# Patient Record
Sex: FEMALE | Race: WHITE | Employment: UNEMPLOYED | URBAN - NONMETROPOLITAN AREA
[De-identification: names, ages, dates, MRNs, and addresses within clinical notes are randomized per-mention and may not be internally consistent; named-entity substitution may affect disease eponyms.]

---

## 2021-08-29 ENCOUNTER — APPOINTMENT (OUTPATIENT)
Dept: GENERAL RADIOLOGY | Age: 51
End: 2021-08-29
Payer: MEDICARE

## 2021-08-29 ENCOUNTER — HOSPITAL ENCOUNTER (EMERGENCY)
Age: 51
Discharge: HOME | End: 2021-08-29
Admitting: NURSE PRACTITIONER
Payer: MEDICARE

## 2021-08-29 VITALS
OXYGEN SATURATION: 100 % | SYSTOLIC BLOOD PRESSURE: 117 MMHG | HEART RATE: 80 BPM | TEMPERATURE: 98.1 F | WEIGHT: 200 LBS | RESPIRATION RATE: 21 BRPM | HEIGHT: 61 IN | DIASTOLIC BLOOD PRESSURE: 71 MMHG | BODY MASS INDEX: 37.76 KG/M2

## 2021-08-29 DIAGNOSIS — M54.6 ACUTE BILATERAL THORACIC BACK PAIN: ICD-10-CM
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
## 2021-08-29 DIAGNOSIS — M25.562 ACUTE PAIN OF LEFT KNEE: ICD-10-CM

## 2021-08-29 PROCEDURE — 99284 EMERGENCY DEPT VISIT MOD MDM: CPT

## 2021-08-29 PROCEDURE — 72070 X-RAY EXAM THORAC SPINE 2VWS: CPT

## 2021-08-29 PROCEDURE — 6370000000 HC RX 637 (ALT 250 FOR IP): Performed by: NURSE PRACTITIONER

## 2021-08-29 PROCEDURE — 73562 X-RAY EXAM OF KNEE 3: CPT

## 2021-08-29 RX ORDER — LIDOCAINE 50 MG/G
1 PATCH TOPICAL EVERY 24 HOURS
Qty: 10 PATCH | Refills: 0 | Status: SHIPPED | OUTPATIENT
Start: 2021-08-29 | End: 2021-09-08

## 2021-08-29 RX ORDER — HYDROCODONE BITARTRATE AND ACETAMINOPHEN 5; 325 MG/1; MG/1
1 TABLET ORAL ONCE
Status: COMPLETED | OUTPATIENT
Start: 2021-08-29 | End: 2021-08-29

## 2021-08-29 RX ADMIN — HYDROCODONE BITARTRATE AND ACETAMINOPHEN 1 TABLET: 5; 325 TABLET ORAL at 12:20

## 2021-08-29 ASSESSMENT — ENCOUNTER SYMPTOMS
SHORTNESS OF BREATH: 0
CHEST TIGHTNESS: 0
BACK PAIN: 1
NAUSEA: 0
DIARRHEA: 0
ABDOMINAL PAIN: 0

## 2021-08-29 ASSESSMENT — PAIN SCALES - GENERAL: PAINLEVEL_OUTOF10: 6

## 2021-08-29 NOTE — ED PROVIDER NOTES
[facility] EMERGENCY DEPT  EMERGENCY DEPARTMENT ENCOUNTER      Pt Name: Jenifer Pack  MRN: [id number]  [place] 1970  Date of evaluation: 8/29/2021  Provider: DAVIDSON Torre 6864       Chief Complaint   Patient presents with    Fall     mechanical fall while walking. co L foot and knee pain. denies LOC or hitting head         HISTORY OF PRESENT ILLNESS   (Location/Symptom, Timing/Onset, Context/Setting, Quality, Duration, Modifying Factors, Severity)  Note limiting factors. Jenifer Pack is a 54 y.o. female who presents to the emergency department with concern for midback pain and left knee pain after falling on Friday. States she was wearing new shoes and got tripped up and fell. Did not hit head no LOC, no head or neck pain. No chest or abdominal pain. Has been ambulatory since. Has tried tylenol at home without relief. Nothing makes better or worse. HPI    Nursing Notes were reviewed. REVIEW OF SYSTEMS    (2-9 systems for level 4, 10 or more for level 5)     Review of Systems   Constitutional: Negative for chills and fever. HENT: Negative for congestion. Respiratory: Negative for chest tightness and shortness of breath. Cardiovascular: Negative for chest pain, palpitations and leg swelling. Gastrointestinal: Negative for abdominal pain, diarrhea and nausea. Genitourinary: Negative for dysuria, flank pain, frequency and urgency. Musculoskeletal: Positive for back pain. Negative for neck pain. Neurological: Negative for dizziness, syncope, weakness, light-headedness, numbness and headaches. Except as noted above the remainder of the review of systems was reviewed and negative. PAST MEDICAL HISTORY   No past medical history on file. SURGICAL HISTORY     No past surgical history on file.       CURRENT MEDICATIONS       Previous Medications    No medications on file       ALLERGIES     Augmentin [amoxicillin-pot clavulanate], Cephalosporins, Daypro [oxaprozin], Linzess [linaclotide], Magnesium-containing compounds, Nabumetone, Naproxen, Penicillins, Savella [milnacipran], Sennosides, Septra [sulfamethoxazole-trimethoprim], Tagamet [cimetidine], and Zyrtec [cetirizine]    FAMILY HISTORY     No family history on file. SOCIAL HISTORY       Social History     Socioeconomic History    Marital status: Single     Spouse name: Not on file    Number of children: Not on file    Years of education: Not on file    Highest education level: Not on file   Occupational History    Not on file   Tobacco Use    Smoking status: Not on file   Substance and Sexual Activity    Alcohol use: Not on file    Drug use: Not on file    Sexual activity: Not on file   Other Topics Concern    Not on file   Social History Narrative    Not on file     Social Determinants of Health     Financial Resource Strain:     Difficulty of Paying Living Expenses:    Food Insecurity:     Worried About Running Out of Food in the Last Year:     [address] in the Last Year:    Transportation Needs:     Lack of Transportation (Medical):      Lack of Transportation (Non-Medical):    Physical Activity:     Days of Exercise per Week:     Minutes of Exercise per Session:    Stress:     Feeling of Stress :    Social Connections:     Frequency of Communication with Friends and Family:     Frequency of Social Gatherings with Friends and Family:     Attends Che[employer] Services:     Active Member of Clubs or Organizations:     Attends Club or Organization Meetings:     Marital Status:    Intimate Partner Violence:     Fear of Current or Ex-Partner:     Emotionally Abused:     Physically Abused:     Sexually Abused:        SCREENINGS        [place] Coma Scale  Eye Opening: Spontaneous  Best Verbal Response: Oriented  Best Motor Response: Obeys commands  [place] Coma Scale Score: 15               PHYSICAL EXAM    (up to 7 for level 4, 8 or more for level 5)     ED Triage Vitals [08/29/21 1141]   BP Temp Temp Source Pulse Resp SpO2 Height Weight   114/76 98.2 °F (36.8 °C) Oral 88 16 94 % 5' 1\" (1.549 m) 200 lb (90.7 kg)       Physical Exam  Vitals reviewed. Constitutional:       General: She is not in acute distress. Appearance: Normal appearance. She is obese. She is not ill-appearing, toxic-appearing or diaphoretic. HENT:      Head: Normocephalic and atraumatic. Right Ear: Tympanic membrane normal.      Left Ear: Tympanic membrane normal.      Nose: Nose normal.      Mouth/Throat:      Mouth: Mucous membranes are moist.      Pharynx: Oropharynx is clear. Eyes:      Extraocular Movements: Extraocular movements intact. Conjunctiva/sclera: Conjunctivae normal.      Pupils: Pupils are equal, round, and reactive to light. Cardiovascular:      Rate and Rhythm: Normal rate and regular rhythm. Pulses: Normal pulses. Heart sounds: Normal heart sounds. Pulmonary:      Effort: Pulmonary effort is normal.      Breath sounds: Normal breath sounds. Abdominal:      General: Bowel sounds are normal.      Palpations: Abdomen is soft. Tenderness: There is no abdominal tenderness. Musculoskeletal:         General: No swelling, deformity or signs of injury. Cervical back: Normal range of motion. Thoracic back: No swelling, edema, deformity, signs of trauma, lacerations, spasms or bony tenderness. Normal range of motion. No scoliosis. Back:       Left knee: No swelling, deformity, effusion, erythema, ecchymosis, lacerations or bony tenderness. Normal range of motion. Tenderness (generalized) present. No medial joint line tenderness. Right lower leg: No edema. Left lower leg: No edema. Skin:     General: Skin is warm and dry. Neurological:      Mental Status: She is alert.          DIAGNOSTIC RESULTS     EKG: All EKG's are interpreted by the Emergency Department Physician who either signs or Co-signs this chart in the absence of a Monitoring:     No flowsheet data found.     (Please note that portions of this note were completed with a voice recognition program.  Efforts were made to edit the dictations but occasionally words are mis-transcribed.)    DAVIDSON Garcia CNP (electronically signed)  Attending Emergency Physician         DAVIDSON Garcia CNP  08/29/21 [id number]

## 2021-11-08 ENCOUNTER — OFFICE VISIT (OUTPATIENT)
Dept: PRIMARY CARE CLINIC | Age: 51
End: 2021-11-08
Payer: MEDICARE

## 2021-11-08 VITALS
HEART RATE: 94 BPM | TEMPERATURE: 97.4 F | BODY MASS INDEX: 49.5 KG/M2 | WEIGHT: 262 LBS | SYSTOLIC BLOOD PRESSURE: 118 MMHG | OXYGEN SATURATION: 97 % | DIASTOLIC BLOOD PRESSURE: 74 MMHG

## 2021-11-08 DIAGNOSIS — N62 LARGE BREASTS: ICD-10-CM

## 2021-11-08 DIAGNOSIS — Z00.00 ROUTINE PHYSICAL EXAMINATION: Primary | ICD-10-CM

## 2021-11-08 DIAGNOSIS — F31.9 BIPOLAR 1 DISORDER (HCC): ICD-10-CM

## 2021-11-08 DIAGNOSIS — Z23 NEED FOR INFLUENZA VACCINATION: ICD-10-CM

## 2021-11-08 DIAGNOSIS — M79.7 FIBROMYALGIA: ICD-10-CM

## 2021-11-08 DIAGNOSIS — M54.9 MID BACK PAIN: ICD-10-CM

## 2021-11-08 DIAGNOSIS — E55.9 VITAMIN D DEFICIENCY: ICD-10-CM

## 2021-11-08 PROCEDURE — 90674 CCIIV4 VAC NO PRSV 0.5 ML IM: CPT | Performed by: NURSE PRACTITIONER

## 2021-11-08 PROCEDURE — G0008 ADMIN INFLUENZA VIRUS VAC: HCPCS | Performed by: NURSE PRACTITIONER

## 2021-11-08 PROCEDURE — 99214 OFFICE O/P EST MOD 30 MIN: CPT | Performed by: NURSE PRACTITIONER

## 2021-11-08 RX ORDER — BUMETANIDE 2 MG/1
TABLET ORAL DAILY
COMMUNITY
Start: 2021-09-25 | End: 2022-01-03 | Stop reason: SDUPTHER

## 2021-11-08 RX ORDER — GABAPENTIN 600 MG/1
600 TABLET ORAL 3 TIMES DAILY
Qty: 90 TABLET | Refills: 3 | Status: SHIPPED | OUTPATIENT
Start: 2021-11-08 | End: 2022-05-03

## 2021-11-08 RX ORDER — CITALOPRAM 40 MG/1
TABLET ORAL DAILY
COMMUNITY
Start: 2021-11-07

## 2021-11-08 RX ORDER — CHOLECALCIFEROL (VITAMIN D3) 125 MCG
500 CAPSULE ORAL DAILY
COMMUNITY

## 2021-11-08 RX ORDER — LEVOTHYROXINE SODIUM 0.1 MG/1
TABLET ORAL DAILY
COMMUNITY
End: 2022-01-03 | Stop reason: SDUPTHER

## 2021-11-08 RX ORDER — ZINC GLUCONATE 50 MG
50 TABLET ORAL DAILY
COMMUNITY

## 2021-11-08 RX ORDER — MULTIVIT WITH MINERALS/LUTEIN
1000 TABLET ORAL DAILY
COMMUNITY

## 2021-11-08 RX ORDER — BUPROPION HYDROCHLORIDE 150 MG/1
150 TABLET, EXTENDED RELEASE ORAL 2 TIMES DAILY
COMMUNITY
End: 2021-11-08

## 2021-11-08 RX ORDER — ARIPIPRAZOLE 5 MG/1
5 TABLET ORAL DAILY
Qty: 30 TABLET | Refills: 11 | Status: SHIPPED | OUTPATIENT
Start: 2021-11-08 | End: 2022-01-03 | Stop reason: SDUPTHER

## 2021-11-08 RX ORDER — GABAPENTIN 300 MG/1
2 CAPSULE ORAL 3 TIMES DAILY
COMMUNITY
Start: 2021-10-04 | End: 2021-11-08 | Stop reason: ALTCHOICE

## 2021-11-08 RX ORDER — OMEPRAZOLE 20 MG/1
TABLET, DELAYED RELEASE ORAL DAILY
COMMUNITY
End: 2022-01-03 | Stop reason: SDUPTHER

## 2021-11-08 RX ORDER — PHENTERMINE HYDROCHLORIDE 37.5 MG/1
TABLET ORAL DAILY
COMMUNITY
End: 2021-11-08

## 2021-11-08 RX ORDER — POTASSIUM CHLORIDE 750 MG/1
TABLET, FILM COATED, EXTENDED RELEASE ORAL DAILY
COMMUNITY
Start: 2021-09-25 | End: 2022-01-03 | Stop reason: SDUPTHER

## 2021-11-08 RX ORDER — ERGOCALCIFEROL 1.25 MG/1
CAPSULE ORAL WEEKLY
COMMUNITY

## 2021-11-08 RX ORDER — CYANOCOBALAMIN 1000 UG/ML
1000 INJECTION INTRAMUSCULAR; SUBCUTANEOUS ONCE
COMMUNITY

## 2021-11-08 RX ORDER — NYSTATIN 100000 U/G
CREAM TOPICAL
COMMUNITY

## 2021-11-08 ASSESSMENT — ENCOUNTER SYMPTOMS
DIARRHEA: 0
NAUSEA: 0
SORE THROAT: 0
TROUBLE SWALLOWING: 0
SHORTNESS OF BREATH: 0
CONSTIPATION: 0
RHINORRHEA: 0
SINUS PRESSURE: 0
VOMITING: 0
COUGH: 0
ABDOMINAL PAIN: 0

## 2021-11-08 NOTE — PROGRESS NOTES
Vaccine Information Sheet, \"Influenza - Inactivated\"  given to Shellye Ahumada, or parent/legal guardian of  Shellye Ahumada and verbalized understanding. Patient responses:    Have you ever had a reaction to a flu vaccine? No  Are you able to eat eggs without adverse effects? Yes  Do you have any current illness? No  Have you ever had Guillian Brea Syndrome? No    Flu vaccine given per order. Please see immunization tab.

## 2021-11-08 NOTE — PROGRESS NOTES
Michael Arroyo (:  1970) is a 54 y.o. female,New patient, here for evaluation of the following chief complaint(s):  New Patient (former [facility] [address] [place] [address]. discuss medication, referral for poss breast reduction. also referral for ortho [facility] pain [facility]. )      ASSESSMENT/PLAN:    ICD-10-CM    1. Routine physical examination  Z00.00 CBC Auto Differential     Comprehensive Metabolic Panel     Vitamin B12     TSH without Reflex     Hemoglobin A1C     Vitamin D 25 Hydroxy     INFLUENZA, MDCK QUADV, 2 YRS AND OLDER, IM, PF, PREFILL SYR OR SDV, 0.5ML (FLUCELVAX QUADV, PF)   2. Need for influenza vaccination  Z23 INFLUENZA, MDCK QUADV, 2 YRS AND OLDER, IM, PF, PREFILL SYR OR SDV, 0.5ML (FLUCELVAX QUADV, PF)   3. Vitamin D deficiency  E55.9 Comprehensive Metabolic Panel     Vitamin D 25 Hydroxy   4. Fibromyalgia  M79.7 gabapentin (NEURONTIN) 600 MG tablet   5. Bipolar 1 disorder (HCC)  F31.9 ARIPiprazole (ABILIFY) 5 MG tablet   6. Large breasts  N62    7. Mid back pain  M54.9        Return in about 6 weeks (around 2021). SUBJECTIVE/OBJECTIVE:  HPI  Here to establish care  Former pt of Dr Victoria Began in [facility] 53    Eyes: This year  Dentist:  routinely  Skin:  No concerns  SBE:  Doesn't do  Mammo:  Last year? Pap:  Due in   Menses: not had one since july  Colonoscopy:  hx of polyps not sure when due  Dexa Scan:  none  Calcium & Vit. D:  daily  MVI:  none  Supplements:  Vitamin C, zinc and b12  Asa:  none  Labs:  due  Exercise:  Daily activity  Body Image: overweight  Diet and Nutr: no special diet   Depression: on medicine concerns with bipolar per sister  Fall:  none  EOL:  none  Tobacco:  none  Substance:  No alcohol, no illicits  Immunizations:  Needs yearly flu and covid series  Sleep good  Cognition good    Health Maintenance: needs labs, covid and flu vaccines.      ? Bipolar   Drop of pen she is crying, screaming, cussing  She is impulsive per family report  Longest gone without sleep is 10-12 hours. On celexa, wellbutrin      GERD  On omeprazole  No dysphagia    Hypothyroid  On synthroid  No concerns    Fibromyalgia and neuropathy  On gabapentin. Symptoms somewhat controlled on  Medicine. B12 def  On b12 injections monthly and daily otc   Have not had one since July  Have been on them for years. Vitamin d def  On vitamin d once a week. Chronic mid back pain  ? [facility] Nothing makes it worse. On gabapentin. Would like breast reduction. Have mid back pain. Wears a 44M bra size. Mammogram done last year, Due next year. Pap smear thinks due in 2023  Colonoscopy (have had 2-3 of them) hx of polyps. /74   Pulse 94   Temp 97.4 °F (36.3 °C) (Temporal)   Wt 262 lb (118.8 kg)   SpO2 97%   BMI 49.50 kg/m²     Review of Systems   Constitutional: Negative for activity change, appetite change, fatigue, fever and unexpected weight change. HENT: Negative for congestion, hearing loss, rhinorrhea, sinus pressure, sore throat and trouble swallowing. Eyes: Negative for visual disturbance. Respiratory: Negative for cough and shortness of breath. Cardiovascular: Negative for chest pain, palpitations and leg swelling. Gastrointestinal: Negative for abdominal pain, constipation, diarrhea, nausea and vomiting. Endocrine: Negative for cold intolerance and heat intolerance. Genitourinary: Negative for flank pain, menstrual problem, pelvic pain, urgency and vaginal discharge. Musculoskeletal: Negative for arthralgias. Skin: Negative for rash. Neurological: Negative for headaches. Psychiatric/Behavioral: Negative for dysphoric mood and sleep disturbance. The patient is not nervous/anxious. Physical Exam  Vitals reviewed. Constitutional:       Appearance: Normal appearance. HENT:      Head: Normocephalic and atraumatic. Right Ear: A PE tube is present. Left Ear: A PE tube is present.       Nose: Nose normal.      Comments: masked     Mouth/Throat:      Mouth: Mucous membranes are moist.      Pharynx: Oropharynx is clear. Comments: masked  Eyes:      Conjunctiva/sclera: Conjunctivae normal.   Cardiovascular:      Rate and Rhythm: Normal rate and regular rhythm. Pulses: Normal pulses. Heart sounds: Normal heart sounds. Pulmonary:      Effort: Pulmonary effort is normal.      Breath sounds: Normal breath sounds. Abdominal:      General: Bowel sounds are normal. There is no distension. Palpations: Abdomen is soft. Tenderness: There is no abdominal tenderness. There is no guarding. Genitourinary:     Comments: deferred  Musculoskeletal:         General: Normal range of motion. Cervical back: Normal range of motion and neck supple. Skin:     General: Skin is warm. Neurological:      Mental Status: She is alert and oriented to person, place, and time. Psychiatric:         Mood and Affect: Mood normal.         Behavior: Behavior normal.         Thought Content: Thought content normal.         Judgment: Judgment normal.                 An electronic signature was used to authenticate this note.     --DAVIDSON Chavez

## 2021-11-09 DIAGNOSIS — Z00.00 ROUTINE PHYSICAL EXAMINATION: ICD-10-CM

## 2021-11-09 DIAGNOSIS — E55.9 VITAMIN D DEFICIENCY: ICD-10-CM

## 2021-11-09 LAB
ALBUMIN SERPL-MCNC: 4.1 G/DL (ref 3.5–5.2)
ALP BLD-CCNC: 115 U/L (ref 35–104)
ALT SERPL-CCNC: 12 U/L (ref 5–33)
ANION GAP SERPL CALCULATED.3IONS-SCNC: 13 MMOL/L (ref 7–19)
AST SERPL-CCNC: 27 U/L (ref 5–32)
BASOPHILS ABSOLUTE: 0 K/UL (ref 0–0.2)
BASOPHILS RELATIVE PERCENT: 0.2 % (ref 0–1)
BILIRUB SERPL-MCNC: 0.5 MG/DL (ref 0.2–1.2)
BUN BLDV-MCNC: 17 MG/DL (ref 6–20)
CALCIUM SERPL-MCNC: 9.5 MG/DL (ref 8.6–10)
CHLORIDE BLD-SCNC: 102 MMOL/L (ref 98–111)
CO2: 26 MMOL/L (ref 22–29)
CREAT SERPL-MCNC: 0.7 MG/DL (ref 0.5–0.9)
EOSINOPHILS ABSOLUTE: 0 K/UL (ref 0–0.6)
EOSINOPHILS RELATIVE PERCENT: 0.6 % (ref 0–5)
GFR AFRICAN AMERICAN: >59
GFR NON-AFRICAN AMERICAN: >60
GLUCOSE BLD-MCNC: 85 MG/DL (ref 74–109)
HBA1C MFR BLD: 5 % (ref 4–6)
HCT VFR BLD CALC: 39.6 % (ref 37–47)
HEMOGLOBIN: 12.7 G/DL (ref 12–16)
IMMATURE GRANULOCYTES #: 0 K/UL
LYMPHOCYTES ABSOLUTE: 0.5 K/UL (ref 1.1–4.5)
LYMPHOCYTES RELATIVE PERCENT: 10 % (ref 20–40)
MCH RBC QN AUTO: 30.5 PG (ref 27–31)
MCHC RBC AUTO-ENTMCNC: 32.1 G/DL (ref 33–37)
MCV RBC AUTO: 95.2 FL (ref 81–99)
MONOCYTES ABSOLUTE: 0.3 K/UL (ref 0–0.9)
MONOCYTES RELATIVE PERCENT: 5.1 % (ref 0–10)
NEUTROPHILS ABSOLUTE: 4.3 K/UL (ref 1.5–7.5)
NEUTROPHILS RELATIVE PERCENT: 83.9 % (ref 50–65)
PDW BLD-RTO: 14.3 % (ref 11.5–14.5)
PLATELET # BLD: 184 K/UL (ref 130–400)
PMV BLD AUTO: 11.3 FL (ref 9.4–12.3)
POTASSIUM SERPL-SCNC: 4 MMOL/L (ref 3.5–5)
RBC # BLD: 4.16 M/UL (ref 4.2–5.4)
SODIUM BLD-SCNC: 141 MMOL/L (ref 136–145)
TOTAL PROTEIN: 6.6 G/DL (ref 6.6–8.7)
TSH SERPL DL<=0.05 MIU/L-ACNC: 1.24 UIU/ML (ref 0.27–4.2)
VITAMIN B-12: 610 PG/ML (ref 211–946)
VITAMIN D 25-HYDROXY: 77.4 NG/ML
WBC # BLD: 5.1 K/UL (ref 4.8–10.8)

## 2021-11-10 ENCOUNTER — TELEPHONE (OUTPATIENT)
Dept: PRIMARY CARE CLINIC | Age: 51
End: 2021-11-10

## 2021-11-10 DIAGNOSIS — N62 LARGE BREASTS: Primary | ICD-10-CM

## 2021-11-10 NOTE — TELEPHONE ENCOUNTER
----- Message from DAVIDSON Ramirez sent at 11/10/2021  7:35 AM CST -----  Please call patient and let them know results. Normal vitamin D  Normal A21  Your metabolic profile is normal.  This includes kidney and liver functions as well as electrolytes.   Normal thyroid

## 2021-11-10 NOTE — TELEPHONE ENCOUNTER
----- Message from DAVIDSON Mata sent at 11/9/2021  1:06 PM CST -----  Please call patient and let them know results.    Blood sugars well controlled with an A1c of 5

## 2021-11-10 NOTE — TELEPHONE ENCOUNTER
----- Message from DAVIDSON Carey sent at 11/9/2021 12:40 PM CST -----  Please call patient and let them know results.    Normal blood counts

## 2021-11-11 ENCOUNTER — TELEPHONE (OUTPATIENT)
Dept: PRIMARY CARE CLINIC | Age: 51
End: 2021-11-11

## 2022-01-03 ENCOUNTER — OFFICE VISIT (OUTPATIENT)
Dept: PRIMARY CARE CLINIC | Age: 52
End: 2022-01-03
Payer: MEDICARE

## 2022-01-03 VITALS
WEIGHT: 247 LBS | OXYGEN SATURATION: 97 % | SYSTOLIC BLOOD PRESSURE: 124 MMHG | BODY MASS INDEX: 46.67 KG/M2 | HEART RATE: 71 BPM | TEMPERATURE: 97.9 F | DIASTOLIC BLOOD PRESSURE: 76 MMHG

## 2022-01-03 DIAGNOSIS — K21.9 GASTROESOPHAGEAL REFLUX DISEASE, UNSPECIFIED WHETHER ESOPHAGITIS PRESENT: ICD-10-CM

## 2022-01-03 DIAGNOSIS — M54.9 MID BACK PAIN: ICD-10-CM

## 2022-01-03 DIAGNOSIS — E03.9 ACQUIRED HYPOTHYROIDISM: ICD-10-CM

## 2022-01-03 DIAGNOSIS — F31.9 BIPOLAR 1 DISORDER (HCC): ICD-10-CM

## 2022-01-03 DIAGNOSIS — E55.9 VITAMIN D DEFICIENCY: Primary | ICD-10-CM

## 2022-01-03 DIAGNOSIS — E66.01 CLASS 3 SEVERE OBESITY DUE TO EXCESS CALORIES WITHOUT SERIOUS COMORBIDITY WITH BODY MASS INDEX (BMI) OF 45.0 TO 49.9 IN ADULT (HCC): ICD-10-CM

## 2022-01-03 DIAGNOSIS — R60.9 SWELLING: ICD-10-CM

## 2022-01-03 PROCEDURE — 99214 OFFICE O/P EST MOD 30 MIN: CPT | Performed by: NURSE PRACTITIONER

## 2022-01-03 RX ORDER — CA/D3/MAG OX/ZINC/COP/MANG/BOR 600 MG-800
1 TABLET,CHEWABLE ORAL DAILY
Qty: 60 TABLET | Refills: 11 | Status: SHIPPED | OUTPATIENT
Start: 2022-01-03

## 2022-01-03 RX ORDER — OMEPRAZOLE 20 MG/1
20 TABLET, DELAYED RELEASE ORAL DAILY
Qty: 90 TABLET | Refills: 3 | Status: SHIPPED | OUTPATIENT
Start: 2022-01-03

## 2022-01-03 RX ORDER — ARIPIPRAZOLE 5 MG/1
5 TABLET ORAL DAILY
Qty: 90 TABLET | Refills: 3 | Status: SHIPPED | OUTPATIENT
Start: 2022-01-03 | End: 2022-06-03 | Stop reason: SDUPTHER

## 2022-01-03 RX ORDER — BUMETANIDE 2 MG/1
2 TABLET ORAL DAILY
Qty: 90 TABLET | Refills: 3 | Status: SHIPPED | OUTPATIENT
Start: 2022-01-03 | End: 2022-06-03 | Stop reason: SDUPTHER

## 2022-01-03 RX ORDER — LEVOTHYROXINE SODIUM 0.1 MG/1
100 TABLET ORAL DAILY
Qty: 90 TABLET | Refills: 3 | Status: SHIPPED | OUTPATIENT
Start: 2022-01-03

## 2022-01-03 RX ORDER — POTASSIUM CHLORIDE 750 MG/1
10 TABLET, FILM COATED, EXTENDED RELEASE ORAL DAILY
Qty: 90 TABLET | Refills: 3 | Status: SHIPPED | OUTPATIENT
Start: 2022-01-03

## 2022-01-03 SDOH — ECONOMIC STABILITY: FOOD INSECURITY: WITHIN THE PAST 12 MONTHS, THE FOOD YOU BOUGHT JUST DIDN'T LAST AND YOU DIDN'T HAVE MONEY TO GET MORE.: NEVER TRUE

## 2022-01-03 SDOH — ECONOMIC STABILITY: FOOD INSECURITY: WITHIN THE PAST 12 MONTHS, YOU WORRIED THAT YOUR FOOD WOULD RUN OUT BEFORE YOU GOT MONEY TO BUY MORE.: NEVER TRUE

## 2022-01-03 ASSESSMENT — ENCOUNTER SYMPTOMS
SHORTNESS OF BREATH: 0
SORE THROAT: 0
BACK PAIN: 1
COUGH: 0
CONSTIPATION: 0
VOMITING: 0
ABDOMINAL PAIN: 0
SINUS PRESSURE: 0
NAUSEA: 0
TROUBLE SWALLOWING: 0
RHINORRHEA: 0
DIARRHEA: 0

## 2022-01-03 ASSESSMENT — PATIENT HEALTH QUESTIONNAIRE - PHQ9
SUM OF ALL RESPONSES TO PHQ QUESTIONS 1-9: 0
SUM OF ALL RESPONSES TO PHQ QUESTIONS 1-9: 0
SUM OF ALL RESPONSES TO PHQ9 QUESTIONS 1 & 2: 0
SUM OF ALL RESPONSES TO PHQ QUESTIONS 1-9: 0
SUM OF ALL RESPONSES TO PHQ QUESTIONS 1-9: 0
1. LITTLE INTEREST OR PLEASURE IN DOING THINGS: 0
2. FEELING DOWN, DEPRESSED OR HOPELESS: 0

## 2022-01-03 ASSESSMENT — SOCIAL DETERMINANTS OF HEALTH (SDOH): HOW HARD IS IT FOR YOU TO PAY FOR THE VERY BASICS LIKE FOOD, HOUSING, MEDICAL CARE, AND HEATING?: NOT HARD AT ALL

## 2022-01-03 NOTE — PATIENT INSTRUCTIONS
Weight loss goal 12 lbs in 3 months  Exercise 200 minutes a week    Work on posture. Patient Education        Learning About Obesity  What is obesity? Obesity means having an unhealthy amount of body fat. This puts your health in danger. It can lead to other health problems, such as type 2 diabetes and high blood pressure. How do you know if your weight is in the obesity range? To know if your weight is in the obesity range, your doctor looks at your body mass index (BMI) and waist size. BMI is a number that is calculated from your weight and your height. To figure out your BMI for yourself, you can use an online tool, such as http://www.dee.com/ on the [employer]. If your BMI is 30.0 or higher, it falls within the obesity range. Keep in mind that BMI and waist size are only guides. They are not tools to determine your ideal body weight. What causes obesity? When you take in more calories than you burn off, you gain weight. How you eat, how active you are, and other things affect how your body uses calories and whether you gain weight. If you have family members who have too much body fat, you may have inherited a tendency to gain weight. And your family also helps form your eating and lifestyle habits, which can lead to obesity. Also, our busy lives make it harder to plan and cook healthy meals. For many of us, it's easier to reach for prepared foods, go out to eat, or go to the drive-through. But these foods are often high in saturated fat and calories. Portions are often too large. What can you do to reach a healthy weight? Focus on health, not diets. Diets are hard to stay on and don't work in the long run. It is very hard to stay with a diet that includes lots of big changes in your eating habits.   Instead of a diet, focus on lifestyle changes that will improve your health and achieve the right balance of energy and calories. To lose weight, you need to burn more calories than you take in. You can do it by eating healthy foods in reasonable amounts and becoming more active, even a little bit every day. Making small changes over time can add up to a lot. Make a plan for change. Many people have found that naming their reasons for change and staying focused on their plan can make a big difference. Work with your doctor to create a plan that is right for you. · Ask yourself: [place] are my personal, most powerful reasons for wanting this change? What will my life look like when I've made the change? \"  · Set your long-term goal. Make it specific, such as \"I will lose x pounds. \"  · Break your long-term goal into smaller, short-term goals. Make these small steps specific and within your reach, things you know you can do. These steps are what keep you going from day to day. Talk with your doctor about other weight-loss options. If you have a BMI in a certain range and have not been able to lose weight with diet and exercise, medicine or surgery may be an option for you. Before your doctor will prescribe medicines or surgery, he or she will probably want you to be more active and follow your healthy eating plan for a period of time. These habits are key lifelong changes for managing your weight, with or without other medical treatment. And these changes can help you avoid weight-related health problems. How can you stay on your plan for change? Be ready. Choose to start during a time when there are few events like holidays, social events, and high-stress periods. These events might trigger slip-ups. Decide on your first few steps. Most people have more success when they make small changes, one step at a time. For example, you might switch a daily candy bar to a piece of fruit, walk 10 minutes more, or add more vegetables to a meal.  Line up your support people. Make sure you're not going to be alone as you make this change.  Connect and safety. Be sure to make and go to all appointments, and call your doctor if you are having problems. It's also a good idea to know your test results and keep a list of the medicines you take. Where can you learn more? Go to https://chnicoletteeb.CommonBond. org and sign in to your [employer] account. Enter N111 in the OneFold box to learn more about \"Learning About Obesity. \"     If you do not have an account, please click on the \"Sign Up Now\" link. Current as of: March 17, 2021               Content Version: 13.1  © 1496-4318 [employer]wise, Incorporated. Care instructions adapted under license by [facility] [place] ([facility]). If you have questions about a medical condition or this instruction, always ask your healthcare professional. [address] any warranty or liability for your use of this information.

## 2022-01-03 NOTE — PROGRESS NOTES
Jeannie Torres (:  1970) is a 54 y.o. female,Established patient, here for evaluation of the following chief complaint(s):  Follow-up (here for follow up. issues with being cold all of the time. )      ASSESSMENT/PLAN:    ICD-10-CM    1. Vitamin D deficiency  E55.9    2. Bipolar 1 disorder (HCC)  F31.9 ARIPiprazole (ABILIFY) 5 MG tablet   3. Mid back pain  M54.9 External Referral To Physical Therapy    Discussed weight loss, posture training and physical therapy will improve back symptoms. 4. Gastroesophageal reflux disease, unspecified whether esophagitis present  K21.9 omeprazole (PRILOSEC OTC) 20 MG tablet   5. Acquired hypothyroidism  E03.9 levothyroxine (SYNTHROID) 100 MCG tablet   6. Swelling  R60.9 potassium chloride (KLOR-CON) 10 MEQ extended release tablet     bumetanide (BUMEX) 2 MG tablet   7. Class 3 severe obesity due to excess calories without serious comorbidity with body mass index (BMI) of 45.0 to 49.9 in adult [facility])  E66.01 Discussed seeing dietician    Family is going to make appt. E38.79        Return in about 3 months (around 4/3/2022). SUBJECTIVE/OBJECTIVE:  HPI  Here for f/u on moods  On Celexa  Last visit was started on abilify for bipolar d/o  Moods have been better. \"I feel better\"    Large breasts  Was referred to Plastic surgery (Dr Karen Starks), family relates that appt was cancelled due to her BMI. Mid back pain  Muscle relaxers do not help b/c they constipate me. Have not tried physical therapy. Pain is worse with exercise. Family states that all she wants to do is eat and sit/lay around. Review of labs drawn from last visit were all normal    /76   Pulse 71   Temp 97.9 °F (36.6 °C) (Temporal)   Wt 247 lb (112 kg)   SpO2 97%   BMI 46.67 kg/m²     Review of Systems   Constitutional: Negative for activity change, appetite change, fatigue, fever and unexpected weight change.    HENT: Negative for congestion, hearing loss, rhinorrhea, sinus pressure, sore throat and trouble swallowing. Eyes: Negative for visual disturbance. Respiratory: Negative for cough and shortness of breath. Cardiovascular: Negative for chest pain, palpitations and leg swelling. Gastrointestinal: Negative for abdominal pain, constipation, diarrhea, nausea and vomiting. Endocrine: Negative for cold intolerance and heat intolerance. Genitourinary: Negative for flank pain, menstrual problem, pelvic pain, urgency and vaginal discharge. Musculoskeletal: Positive for back pain. Negative for arthralgias. Skin: Negative for rash. Neurological: Negative for headaches. Psychiatric/Behavioral: Negative for dysphoric mood and sleep disturbance. The patient is not nervous/anxious. Physical Exam  Vitals reviewed. Constitutional:       Appearance: Normal appearance. She is obese. HENT:      Head: Normocephalic and atraumatic. Eyes:      Conjunctiva/sclera: Conjunctivae normal.   Cardiovascular:      Rate and Rhythm: Normal rate and regular rhythm. Pulses: Normal pulses. Heart sounds: Normal heart sounds. Pulmonary:      Effort: Pulmonary effort is normal.      Breath sounds: Normal breath sounds. Abdominal:      General: Bowel sounds are normal. There is no distension. Palpations: Abdomen is soft. Tenderness: There is no abdominal tenderness. There is no guarding. Musculoskeletal:         General: Normal range of motion. Cervical back: Normal range of motion and neck supple. Skin:     General: Skin is warm. Neurological:      Mental Status: She is alert and oriented to person, place, and time. An electronic signature was used to authenticate this note.     --DAVIDSON Hansen

## 2022-05-02 ENCOUNTER — TELEPHONE (OUTPATIENT)
Dept: PRIMARY CARE CLINIC | Age: 52
End: 2022-05-02

## 2022-05-03 DIAGNOSIS — M79.7 FIBROMYALGIA: ICD-10-CM

## 2022-05-03 RX ORDER — GABAPENTIN 600 MG/1
TABLET ORAL
Qty: 90 TABLET | Refills: 3 | Status: SHIPPED | OUTPATIENT
Start: 2022-05-03 | End: 2022-10-19

## 2022-05-03 NOTE — TELEPHONE ENCOUNTER
Requested Prescriptions     Pending Prescriptions Disp Refills    gabapentin (NEURONTIN) 600 MG tablet [Pharmacy Med Name: Gabapentin 600 MG Oral Tablet] 90 tablet 3     Sig: TAKE 1 TABLET BY MOUTH THREE TIMES DAILY

## 2022-06-03 ENCOUNTER — TELEPHONE (OUTPATIENT)
Dept: PSYCHIATRY | Age: 52
End: 2022-06-03

## 2022-06-03 ENCOUNTER — OFFICE VISIT (OUTPATIENT)
Dept: PRIMARY CARE CLINIC | Age: 52
End: 2022-06-03
Payer: MEDICARE

## 2022-06-03 VITALS
SYSTOLIC BLOOD PRESSURE: 130 MMHG | TEMPERATURE: 97.3 F | WEIGHT: 206 LBS | DIASTOLIC BLOOD PRESSURE: 76 MMHG | BODY MASS INDEX: 38.92 KG/M2 | HEART RATE: 81 BPM | OXYGEN SATURATION: 97 %

## 2022-06-03 DIAGNOSIS — R60.9 SWELLING: ICD-10-CM

## 2022-06-03 DIAGNOSIS — F31.9 BIPOLAR 1 DISORDER (HCC): Primary | ICD-10-CM

## 2022-06-03 DIAGNOSIS — F31.9 BIPOLAR 1 DISORDER (HCC): ICD-10-CM

## 2022-06-03 DIAGNOSIS — E66.09 CLASS 2 OBESITY DUE TO EXCESS CALORIES WITHOUT SERIOUS COMORBIDITY WITH BODY MASS INDEX (BMI) OF 38.0 TO 38.9 IN ADULT: ICD-10-CM

## 2022-06-03 LAB
ALBUMIN SERPL-MCNC: 4.5 G/DL (ref 3.5–5.2)
ALP BLD-CCNC: 107 U/L (ref 35–104)
ALT SERPL-CCNC: 6 U/L (ref 5–33)
ANION GAP SERPL CALCULATED.3IONS-SCNC: 14 MMOL/L (ref 7–19)
AST SERPL-CCNC: 18 U/L (ref 5–32)
BASOPHILS ABSOLUTE: 0 K/UL (ref 0–0.2)
BASOPHILS RELATIVE PERCENT: 0.5 % (ref 0–1)
BILIRUB SERPL-MCNC: 0.6 MG/DL (ref 0.2–1.2)
BUN BLDV-MCNC: 12 MG/DL (ref 6–20)
CALCIUM SERPL-MCNC: 9.5 MG/DL (ref 8.6–10)
CHLORIDE BLD-SCNC: 99 MMOL/L (ref 98–111)
CO2: 29 MMOL/L (ref 22–29)
CREAT SERPL-MCNC: 0.7 MG/DL (ref 0.5–0.9)
EOSINOPHILS ABSOLUTE: 0 K/UL (ref 0–0.6)
EOSINOPHILS RELATIVE PERCENT: 0.9 % (ref 0–5)
GFR AFRICAN AMERICAN: >59
GFR NON-AFRICAN AMERICAN: >60
GLUCOSE BLD-MCNC: 82 MG/DL (ref 74–109)
HCT VFR BLD CALC: 42.5 % (ref 37–47)
HEMOGLOBIN: 13.6 G/DL (ref 12–16)
IMMATURE GRANULOCYTES #: 0 K/UL
LYMPHOCYTES ABSOLUTE: 1.2 K/UL (ref 1.1–4.5)
LYMPHOCYTES RELATIVE PERCENT: 28.4 % (ref 20–40)
MCH RBC QN AUTO: 30.4 PG (ref 27–31)
MCHC RBC AUTO-ENTMCNC: 32 G/DL (ref 33–37)
MCV RBC AUTO: 95.1 FL (ref 81–99)
MONOCYTES ABSOLUTE: 0.3 K/UL (ref 0–0.9)
MONOCYTES RELATIVE PERCENT: 7.3 % (ref 0–10)
NEUTROPHILS ABSOLUTE: 2.7 K/UL (ref 1.5–7.5)
NEUTROPHILS RELATIVE PERCENT: 62.9 % (ref 50–65)
PDW BLD-RTO: 14.3 % (ref 11.5–14.5)
PLATELET # BLD: 229 K/UL (ref 130–400)
PMV BLD AUTO: 11.9 FL (ref 9.4–12.3)
POTASSIUM SERPL-SCNC: 3.7 MMOL/L (ref 3.5–5)
RBC # BLD: 4.47 M/UL (ref 4.2–5.4)
SODIUM BLD-SCNC: 142 MMOL/L (ref 136–145)
TOTAL PROTEIN: 7.2 G/DL (ref 6.6–8.7)
WBC # BLD: 4.4 K/UL (ref 4.8–10.8)

## 2022-06-03 PROCEDURE — 99214 OFFICE O/P EST MOD 30 MIN: CPT | Performed by: NURSE PRACTITIONER

## 2022-06-03 RX ORDER — BUMETANIDE 2 MG/1
2 TABLET ORAL DAILY
Qty: 90 TABLET | Refills: 3 | Status: SHIPPED | OUTPATIENT
Start: 2022-06-03

## 2022-06-03 RX ORDER — ARIPIPRAZOLE 5 MG/1
5 TABLET ORAL DAILY
Qty: 90 TABLET | Refills: 3 | Status: SHIPPED | OUTPATIENT
Start: 2022-06-03

## 2022-06-03 RX ORDER — DIVALPROEX SODIUM 500 MG/1
500 TABLET, EXTENDED RELEASE ORAL NIGHTLY
Qty: 30 TABLET | Refills: 3 | Status: SHIPPED | OUTPATIENT
Start: 2022-06-03 | End: 2022-10-17 | Stop reason: SDUPTHER

## 2022-06-03 ASSESSMENT — ENCOUNTER SYMPTOMS
DIARRHEA: 0
SORE THROAT: 0
SHORTNESS OF BREATH: 0
RHINORRHEA: 0
SINUS PRESSURE: 0
VOMITING: 0
TROUBLE SWALLOWING: 0
ABDOMINAL PAIN: 0
NAUSEA: 0
COUGH: 0
CONSTIPATION: 0
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
## 2022-06-03 NOTE — TELEPHONE ENCOUNTER
Left voicemail for return call to schedule first appointment with Maximino Ca. Advised to call [phone].

## 2022-06-06 ENCOUNTER — TELEPHONE (OUTPATIENT)
Dept: PRIMARY CARE CLINIC | Age: 52
End: 2022-06-06

## 2022-06-06 NOTE — TELEPHONE ENCOUNTER
----- Message from DAVIDSON Richard sent at 6/6/2022  7:08 AM CDT -----  Please call patient and let them know results. Your metabolic profile is normal.  This includes kidney and liver functions as well as electrolytes.   Normal blood counts    Okay to start medication as discussed at office visit

## 2022-06-10 ENCOUNTER — TELEPHONE (OUTPATIENT)
Dept: PSYCHIATRY | Age: 52
End: 2022-06-10

## 2022-06-10 NOTE — TELEPHONE ENCOUNTER
S/w Parmjit Blair, patient's sister, she is going to call me back to schedule patient's appointment.

## 2022-07-01 ENCOUNTER — TELEPHONE (OUTPATIENT)
Dept: PRIMARY CARE CLINIC | Age: 52
End: 2022-07-01

## 2022-07-01 NOTE — TELEPHONE ENCOUNTER
Final attempt to schedule appointment with Enedina Hutchinson LCSW. Referral closed and message sent to PCP.

## 2022-10-17 DIAGNOSIS — F31.9 BIPOLAR 1 DISORDER (HCC): ICD-10-CM

## 2022-10-17 RX ORDER — DIVALPROEX SODIUM 500 MG/1
500 TABLET, EXTENDED RELEASE ORAL NIGHTLY
Qty: 30 TABLET | Refills: 3 | Status: SHIPPED | OUTPATIENT
Start: 2022-10-17

## 2022-10-17 NOTE — TELEPHONE ENCOUNTER
Received fax from pharmacy requesting refill on pts medication(s). Pt was last seen in office on 6/3/2022  and has a follow up scheduled for 10/24/2022. Will send request to  Epifanio Barnhart  for authorization.      Requested Prescriptions     Pending Prescriptions Disp Refills    divalproex (DEPAKOTE ER) 500 MG extended release tablet 30 tablet 3     Sig: Take 1 tablet by mouth at bedtime

## 2022-10-19 DIAGNOSIS — M79.7 FIBROMYALGIA: ICD-10-CM

## 2022-10-19 RX ORDER — GABAPENTIN 600 MG/1
TABLET ORAL
Qty: 90 TABLET | Refills: 0 | Status: SHIPPED | OUTPATIENT
Start: 2022-10-19 | End: 2022-11-18

## 2022-10-19 NOTE — TELEPHONE ENCOUNTER
Received fax from pharmacy requesting refill on pts medication(s). Pt was last seen in office on 6/3/2022  and has a follow up scheduled for 10/24/2022. Will send request to  Yeimi Flanagan  for authorization.      Requested Prescriptions     Pending Prescriptions Disp Refills    gabapentin (NEURONTIN) 600 MG tablet [Pharmacy Med Name: Gabapentin 600 MG Oral Tablet] 90 tablet 0     Sig: TAKE 1 TABLET BY MOUTH THREE TIMES DAILY

## 2022-12-12 DIAGNOSIS — M79.7 FIBROMYALGIA: ICD-10-CM

## 2022-12-12 RX ORDER — GABAPENTIN 600 MG/1
600 TABLET ORAL 3 TIMES DAILY
Qty: 90 TABLET | Refills: 0 | Status: SHIPPED | OUTPATIENT
Start: 2022-12-12 | End: 2023-01-11

## 2022-12-12 NOTE — TELEPHONE ENCOUNTER
Received fax from pharmacy requesting refill on pts medication(s). Pt was last seen in office on 6/3/2022  and has a follow up scheduled for Visit date not found. Will send request to  Clarence Prater  for authorization.      Requested Prescriptions     Pending Prescriptions Disp Refills    gabapentin (NEURONTIN) 600 MG tablet [Pharmacy Med Name: Gabapentin 600 MG Oral Tablet] 90 tablet 0     Sig: TAKE 1 TABLET BY MOUTH THREE TIMES DAILY

## 2023-01-04 DIAGNOSIS — M79.7 FIBROMYALGIA: ICD-10-CM

## 2023-01-09 DIAGNOSIS — R60.9 SWELLING: ICD-10-CM

## 2023-01-09 DIAGNOSIS — M79.7 FIBROMYALGIA: ICD-10-CM

## 2023-01-09 RX ORDER — POTASSIUM CHLORIDE 750 MG/1
10 TABLET, FILM COATED, EXTENDED RELEASE ORAL DAILY
Qty: 90 TABLET | Refills: 0 | Status: SHIPPED | OUTPATIENT
Start: 2023-01-09

## 2023-01-09 RX ORDER — GABAPENTIN 600 MG/1
600 TABLET ORAL 3 TIMES DAILY
Qty: 90 TABLET | Refills: 0 | Status: SHIPPED | OUTPATIENT
Start: 2023-01-09 | End: 2023-02-08

## 2023-01-09 NOTE — TELEPHONE ENCOUNTER
Received fax from pharmacy requesting refill on pts medication(s). Pt was last seen in office on 6/3/2022  and has a follow up scheduled for 1/16/2023. Will send request to  Lauro Lofton  for authorization.      Requested Prescriptions     Pending Prescriptions Disp Refills    gabapentin (NEURONTIN) 600 MG tablet [Pharmacy Med Name: Gabapentin 600 MG Oral Tablet] 90 tablet 0     Sig: TAKE 1 TABLET BY MOUTH THREE TIMES DAILY    potassium chloride (KLOR-CON) 10 MEQ extended release tablet [Pharmacy Med Name: Potassium Chloride ER 10 MEQ Oral Tablet Extended Release] 90 tablet 0     Sig: Take 1 tablet by mouth once daily

## 2023-01-16 ENCOUNTER — TELEMEDICINE (OUTPATIENT)
Dept: FAMILY MEDICINE CLINIC | Age: 53
End: 2023-01-16
Payer: MEDICARE

## 2023-01-16 DIAGNOSIS — Z11.4 ENCOUNTER FOR SCREENING FOR HIV: ICD-10-CM

## 2023-01-16 DIAGNOSIS — R60.9 PERIPHERAL EDEMA: ICD-10-CM

## 2023-01-16 DIAGNOSIS — Z11.59 NEED FOR HEPATITIS C SCREENING TEST: ICD-10-CM

## 2023-01-16 DIAGNOSIS — F31.9 BIPOLAR 1 DISORDER (HCC): ICD-10-CM

## 2023-01-16 DIAGNOSIS — K21.9 GASTROESOPHAGEAL REFLUX DISEASE, UNSPECIFIED WHETHER ESOPHAGITIS PRESENT: ICD-10-CM

## 2023-01-16 DIAGNOSIS — E55.9 VITAMIN D DEFICIENCY: ICD-10-CM

## 2023-01-16 DIAGNOSIS — Z79.899 MEDICATION MANAGEMENT: ICD-10-CM

## 2023-01-16 DIAGNOSIS — E03.9 ACQUIRED HYPOTHYROIDISM: ICD-10-CM

## 2023-01-16 DIAGNOSIS — M79.7 FIBROMYALGIA: ICD-10-CM

## 2023-01-16 DIAGNOSIS — Z12.31 ENCOUNTER FOR SCREENING MAMMOGRAM FOR MALIGNANT NEOPLASM OF BREAST: ICD-10-CM

## 2023-01-16 DIAGNOSIS — M54.9 MID BACK PAIN: Primary | ICD-10-CM

## 2023-01-16 DIAGNOSIS — Z00.00 ROUTINE PHYSICAL EXAMINATION: ICD-10-CM

## 2023-01-16 PROCEDURE — 99213 OFFICE O/P EST LOW 20 MIN: CPT | Performed by: NURSE PRACTITIONER

## 2023-01-16 RX ORDER — GABAPENTIN 600 MG/1
600 TABLET ORAL 3 TIMES DAILY
Qty: 90 TABLET | Refills: 0 | Status: SHIPPED | OUTPATIENT
Start: 2023-01-16 | End: 2023-02-15

## 2023-01-16 RX ORDER — BUPROPION HYDROCHLORIDE 150 MG/1
150 TABLET ORAL EVERY MORNING
COMMUNITY

## 2023-01-16 ASSESSMENT — ENCOUNTER SYMPTOMS
ABDOMINAL PAIN: 0
SINUS PRESSURE: 0
CONSTIPATION: 0
COUGH: 0
TROUBLE SWALLOWING: 0
RHINORRHEA: 0
SHORTNESS OF BREATH: 0
VOMITING: 0
NAUSEA: 0
DIARRHEA: 0
SORE THROAT: 0

## 2023-01-16 ASSESSMENT — PATIENT HEALTH QUESTIONNAIRE - PHQ9
SUM OF ALL RESPONSES TO PHQ9 QUESTIONS 1 & 2: 0
5. POOR APPETITE OR OVEREATING: 0
SUM OF ALL RESPONSES TO PHQ QUESTIONS 1-9: 0
7. TROUBLE CONCENTRATING ON THINGS, SUCH AS READING THE NEWSPAPER OR WATCHING TELEVISION: 0
SUM OF ALL RESPONSES TO PHQ QUESTIONS 1-9: 0
SUM OF ALL RESPONSES TO PHQ QUESTIONS 1-9: 0
6. FEELING BAD ABOUT YOURSELF - OR THAT YOU ARE A FAILURE OR HAVE LET YOURSELF OR YOUR FAMILY DOWN: 0
2. FEELING DOWN, DEPRESSED OR HOPELESS: 0
1. LITTLE INTEREST OR PLEASURE IN DOING THINGS: 0
10. IF YOU CHECKED OFF ANY PROBLEMS, HOW DIFFICULT HAVE THESE PROBLEMS MADE IT FOR YOU TO DO YOUR WORK, TAKE CARE OF THINGS AT HOME, OR GET ALONG WITH OTHER PEOPLE: 0
9. THOUGHTS THAT YOU WOULD BE BETTER OFF DEAD, OR OF HURTING YOURSELF: 0
8. MOVING OR SPEAKING SO SLOWLY THAT OTHER PEOPLE COULD HAVE NOTICED. OR THE OPPOSITE, BEING SO FIGETY OR RESTLESS THAT YOU HAVE BEEN MOVING AROUND A LOT MORE THAN USUAL: 0
4. FEELING TIRED OR HAVING LITTLE ENERGY: 0
SUM OF ALL RESPONSES TO PHQ QUESTIONS 1-9: 0
3. TROUBLE FALLING OR STAYING ASLEEP: 0

## 2023-01-16 NOTE — PROGRESS NOTES
Fili Cohen (:  1970) is a Established patient, here for evaluation of the following:    Assessment & Plan   Below is the assessment and plan developed based on review of pertinent history, physical exam, labs, studies, and medications. 1. Mid back pain  -     CBC with Auto Differential; Future  -     Comprehensive Metabolic Panel; Future  -     Lipid Panel; Future  -     TSH; Future  -     T4, Free; Future  -     Microalbumin / Creatinine Urine Ratio; Future  -     Vitamin D 25 Hydroxy; Future  -     HIV Rapid 1&2; Future  -     Hepatitis C Antibody; Future  2. Routine physical examination  -     CBC with Auto Differential; Future  -     Comprehensive Metabolic Panel; Future  -     Lipid Panel; Future  -     TSH; Future  -     T4, Free; Future  -     Microalbumin / Creatinine Urine Ratio; Future  -     Vitamin D 25 Hydroxy; Future  -     ANTONIO DIGITAL SCREEN W OR WO CAD BILATERAL; Future  -     HIV Rapid 1&2; Future  -     Hepatitis C Antibody; Future    Going to get set up for appt to get physical, pap smear and labs   3. Vitamin D deficiency  -     CBC with Auto Differential; Future  -     Comprehensive Metabolic Panel; Future  -     Lipid Panel; Future  -     TSH; Future  -     T4, Free; Future  -     Microalbumin / Creatinine Urine Ratio; Future  -     Vitamin D 25 Hydroxy; Future  -     HIV Rapid 1&2; Future  -     Hepatitis C Antibody; Future  4. Acquired hypothyroidism  -     CBC with Auto Differential; Future  -     Comprehensive Metabolic Panel; Future  -     Lipid Panel; Future  -     TSH; Future  -     T4, Free; Future  -     Microalbumin / Creatinine Urine Ratio; Future  -     Vitamin D 25 Hydroxy; Future  -     HIV Rapid 1&2; Future  -     Hepatitis C Antibody; Future  5. Gastroesophageal reflux disease, unspecified whether esophagitis present  -     CBC with Auto Differential; Future  -     Comprehensive Metabolic Panel; Future  -     Lipid Panel; Future  -     TSH;  Future  -     T4, Free; Future  -     Microalbumin / Creatinine Urine Ratio; Future  -     Vitamin D 25 Hydroxy; Future  -     HIV Rapid 1&2; Future  -     Hepatitis C Antibody; Future  6. Bipolar 1 disorder (HCC)  -     CBC with Auto Differential; Future  -     Comprehensive Metabolic Panel; Future  -     Lipid Panel; Future  -     TSH; Future  -     T4, Free; Future  -     Microalbumin / Creatinine Urine Ratio; Future  -     Vitamin D 25 Hydroxy; Future  -     HIV Rapid 1&2; Future  -     Hepatitis C Antibody; Future  -     Valproic Acid Level, Free; Future  7. Need for hepatitis C screening test  -     Hepatitis C Antibody; Future  8. Encounter for screening for HIV  -     HIV Rapid 1&2; Future  9. Peripheral edema  -     CBC with Auto Differential; Future  -     Comprehensive Metabolic Panel; Future  -     Lipid Panel; Future  -     TSH; Future  -     T4, Free; Future  -     Microalbumin / Creatinine Urine Ratio; Future  -     Vitamin D 25 Hydroxy; Future  -     HIV Rapid 1&2; Future  -     Hepatitis C Antibody; Future  10. Encounter for screening mammogram for malignant neoplasm of breast  -     ANTONIO DIGITAL SCREEN W OR WO CAD BILATERAL; Future  11. Medication management  -     Valproic Acid Level, Free; Future  12. Fibromyalgia  -     gabapentin (NEURONTIN) 600 MG tablet; Take 1 tablet by mouth 3 times daily for 30 days. Max Daily Amount: 1,800 mg, Disp-90 tablet, R-0Normal  Return in about 4 weeks (around 2/13/2023) for for pap smear. Subjective   HPI  Video visit for medication refills. Due for labs and mammogram.   Thinks last colonoscopy was told didn't have to have another one for 5 years. Thinks was done 2021 (Dr Teddy White in [address])    Hypothyroidism:   Medication:    Synthroid 100mcg daily  Recent symptoms: none. She denies none. Patient is  taking her medication consistently on an empty stomach.     No results found for: [facility]  Lab Results   Component Value Date    TSH 1.240 11/09/2021 Bipolar/moods  Medications   Depakote, celexa, wellbutrin  Moods controlled on medicine. No concerns. Vitamin D def  Medication   Vitamin D 89761 units once weekly  No concerns. Mid back pain  Medications:   Gabapentin  Last fill was 12/12/2022  No concerns. \"The medicine helps my symptoms\"    Review of Systems   Constitutional:  Negative for activity change, appetite change, fatigue, fever and unexpected weight change. HENT:  Negative for congestion, hearing loss, rhinorrhea, sinus pressure, sore throat and trouble swallowing. Eyes:  Negative for visual disturbance. Respiratory:  Negative for cough and shortness of breath. Cardiovascular:  Negative for chest pain, palpitations and leg swelling. Gastrointestinal:  Negative for abdominal pain, constipation, diarrhea, nausea and vomiting. Endocrine: Negative for cold intolerance and heat intolerance. Genitourinary:  Negative for flank pain, menstrual problem, pelvic pain, urgency and vaginal discharge. Musculoskeletal:  Negative for arthralgias. Skin:  Negative for rash. Neurological:  Negative for headaches. Psychiatric/Behavioral:  Negative for dysphoric mood and sleep disturbance. The patient is not nervous/anxious.          Objective   Patient-Reported Vitals  No data recorded     Physical Exam  [INSTRUCTIONS:  \"[x]\" Indicates a positive item  \"[]\" Indicates a negative item  -- DELETE ALL ITEMS NOT EXAMINED]    Constitutional: [x] Appears well-developed and well-nourished [x] No apparent distress      [] Abnormal -     Mental status: [x] Alert and awake  [x] Oriented to person/place/time [x] Able to follow commands    [] Abnormal -     Eyes:   EOM    [x]  Normal    [] Abnormal -   Sclera  [x]  Normal    [] Abnormal -          Discharge [x]  None visible   [] Abnormal -     HENT: [x] Normocephalic, atraumatic  [] Abnormal -   [x] Mouth/Throat: Mucous membranes are moist    External Ears [x] Normal  [] Abnormal -    Neck: [x] No visualized mass [] Abnormal -     Pulmonary/Chest: [x] Respiratory effort normal   [x] No visualized signs of difficulty breathing or respiratory distress        [] Abnormal -      Musculoskeletal:   [x] Normal gait with no signs of ataxia         [x] Normal range of motion of neck        [] Abnormal -     Neurological:        [x] No Facial Asymmetry (Cranial nerve 7 motor function) (limited exam due to video visit)          [x] No gaze palsy        [] Abnormal -          Skin:        [x] No significant exanthematous lesions or discoloration noted on facial skin         [] Abnormal -            Psychiatric:       [x] Normal Affect [] Abnormal -        [x] No Hallucinations    Other pertinent observable physical exam findings:-             Mick Mackey, was evaluated through a synchronous (real-time) audio-video encounter. The patient (or guardian if applicable) is aware that this is a billable service, which includes applicable co-pays. This Virtual Visit was conducted with patient's (and/or legal guardian's) consent. The visit was conducted pursuant to the emergency declaration under the [address] [place] [address], [address] [place] [address] authority and the [employer] and [facility] General Act. Patient identification was verified, and a caregiver was present when appropriate. The patient was located at Home: [place] [address]. Provider was located at [facility] (Appt Dept): [address] [facility],  [address]         --DAVIDSON Huizar

## 2023-01-24 DIAGNOSIS — Z79.899 MEDICATION MANAGEMENT: ICD-10-CM

## 2023-01-24 DIAGNOSIS — M54.9 MID BACK PAIN: ICD-10-CM

## 2023-01-24 DIAGNOSIS — Z11.59 NEED FOR HEPATITIS C SCREENING TEST: ICD-10-CM

## 2023-01-24 DIAGNOSIS — K21.9 GASTROESOPHAGEAL REFLUX DISEASE, UNSPECIFIED WHETHER ESOPHAGITIS PRESENT: ICD-10-CM

## 2023-01-24 DIAGNOSIS — Z00.00 ROUTINE PHYSICAL EXAMINATION: ICD-10-CM

## 2023-01-24 DIAGNOSIS — R60.9 PERIPHERAL EDEMA: ICD-10-CM

## 2023-01-24 DIAGNOSIS — E03.9 ACQUIRED HYPOTHYROIDISM: ICD-10-CM

## 2023-01-24 DIAGNOSIS — F31.9 BIPOLAR 1 DISORDER (HCC): ICD-10-CM

## 2023-01-24 DIAGNOSIS — Z11.4 ENCOUNTER FOR SCREENING FOR HIV: ICD-10-CM

## 2023-01-24 DIAGNOSIS — E55.9 VITAMIN D DEFICIENCY: ICD-10-CM

## 2023-01-24 LAB
ALBUMIN SERPL-MCNC: 4.3 G/DL (ref 3.5–5.2)
ALP BLD-CCNC: 130 U/L (ref 35–104)
ALT SERPL-CCNC: 9 U/L (ref 5–33)
ANION GAP SERPL CALCULATED.3IONS-SCNC: 14 MMOL/L (ref 7–19)
AST SERPL-CCNC: 19 U/L (ref 5–32)
BASOPHILS ABSOLUTE: 0 K/UL (ref 0–0.2)
BASOPHILS RELATIVE PERCENT: 0.7 % (ref 0–1)
BILIRUB SERPL-MCNC: 0.5 MG/DL (ref 0.2–1.2)
BUN BLDV-MCNC: 17 MG/DL (ref 6–20)
CALCIUM SERPL-MCNC: 9.6 MG/DL (ref 8.6–10)
CHLORIDE BLD-SCNC: 100 MMOL/L (ref 98–111)
CHOLESTEROL, TOTAL: 188 MG/DL (ref 160–199)
CO2: 28 MMOL/L (ref 22–29)
CREAT SERPL-MCNC: 0.9 MG/DL (ref 0.5–0.9)
CREATININE URINE: 349.3 MG/DL (ref 4.2–622)
EOSINOPHILS ABSOLUTE: 0.1 K/UL (ref 0–0.6)
EOSINOPHILS RELATIVE PERCENT: 1.6 % (ref 0–5)
GFR SERPL CREATININE-BSD FRML MDRD: >60 ML/MIN/{1.73_M2}
GLUCOSE BLD-MCNC: 97 MG/DL (ref 74–109)
HCT VFR BLD CALC: 43.1 % (ref 37–47)
HDLC SERPL-MCNC: 62 MG/DL (ref 65–121)
HEMOGLOBIN: 14.1 G/DL (ref 12–16)
HEPATITIS C ANTIBODY INTERPRETATION: NORMAL
HIV-1 P24 AG: NORMAL
IMMATURE GRANULOCYTES #: 0 K/UL
LDL CHOLESTEROL CALCULATED: 111 MG/DL
LYMPHOCYTES ABSOLUTE: 1.2 K/UL (ref 1.1–4.5)
LYMPHOCYTES RELATIVE PERCENT: 26.3 % (ref 20–40)
MCH RBC QN AUTO: 30.9 PG (ref 27–31)
MCHC RBC AUTO-ENTMCNC: 32.7 G/DL (ref 33–37)
MCV RBC AUTO: 94.5 FL (ref 81–99)
MICROALBUMIN UR-MCNC: 2.1 MG/DL (ref 0–19)
MICROALBUMIN/CREAT UR-RTO: 6 MG/G
MONOCYTES ABSOLUTE: 0.4 K/UL (ref 0–0.9)
MONOCYTES RELATIVE PERCENT: 8.2 % (ref 0–10)
NEUTROPHILS ABSOLUTE: 2.8 K/UL (ref 1.5–7.5)
NEUTROPHILS RELATIVE PERCENT: 63 % (ref 50–65)
PDW BLD-RTO: 13.6 % (ref 11.5–14.5)
PLATELET # BLD: 221 K/UL (ref 130–400)
PMV BLD AUTO: 11.1 FL (ref 9.4–12.3)
POTASSIUM SERPL-SCNC: 4 MMOL/L (ref 3.5–5)
RAPID HIV 1&2: NORMAL
RBC # BLD: 4.56 M/UL (ref 4.2–5.4)
SODIUM BLD-SCNC: 142 MMOL/L (ref 136–145)
T4 FREE: 1.38 NG/DL (ref 0.93–1.7)
TOTAL PROTEIN: 7.1 G/DL (ref 6.6–8.7)
TRIGL SERPL-MCNC: 75 MG/DL (ref 0–149)
TSH SERPL DL<=0.05 MIU/L-ACNC: 0.91 UIU/ML (ref 0.27–4.2)
VITAMIN D 25-HYDROXY: 92 NG/ML
WBC # BLD: 4.5 K/UL (ref 4.8–10.8)

## 2023-01-25 ENCOUNTER — TELEPHONE (OUTPATIENT)
Dept: FAMILY MEDICINE CLINIC | Age: 53
End: 2023-01-25

## 2023-01-25 NOTE — TELEPHONE ENCOUNTER
----- Message from DAVIDSON Meneses sent at 1/24/2023  5:10 PM CST -----  Please call patient and let them know results. No pathologic protein in urine. Your LDL cholesterol is mildly elevated, but within the range of getting it down by diet and exercise. Normal vitamin D  Your metabolic profile is normal.  This includes kidney and liver functions as well as electrolytes.   Normal thyroid  Hepatitis C screening negative  HIV screening negative  Normal blood counts

## 2023-01-25 NOTE — TELEPHONE ENCOUNTER
I spoke with the patients sister, Darcy Scruggs about this. Darcy Scruggs asked that since the patients Vitamin D was normal, should she continue taking the Vitamin D weekly?

## 2023-01-27 ENCOUNTER — TELEPHONE (OUTPATIENT)
Dept: FAMILY MEDICINE CLINIC | Age: 53
End: 2023-01-27

## 2023-01-27 LAB — VALPROIC ACID, FREE: 0.5 UG/ML (ref 7–23)

## 2023-01-27 NOTE — TELEPHONE ENCOUNTER
----- Message from DAVIDSON Huizar sent at 1/27/2023  1:32 PM CST -----  Please call patient and let them know results. Low valproic acid level. Is she taking medicine, If so it is ok just dont want it high.

## 2023-01-27 NOTE — TELEPHONE ENCOUNTER
Spoke with patient's sister, aware and acknowledged results. Sister states that the only medication she is taking are tos prescribed by FRANCINE Montgomery. If there is a specific medication you would like to know if she is currently taking please advise and I will reach back out to patient.

## 2023-02-09 ENCOUNTER — TELEPHONE (OUTPATIENT)
Dept: FAMILY MEDICINE CLINIC | Age: 53
End: 2023-02-09

## 2023-02-09 NOTE — TELEPHONE ENCOUNTER
----- Message from Lolly Monday, APRN sent at 2/8/2023  4:45 PM CST -----  Mammogram is negative.   Recommend routine annual mammography

## 2023-02-09 NOTE — TELEPHONE ENCOUNTER
I have attempted without success to contact this patient by phone to discuss lab results.   I left a message for the patient to call us with any questions or concerns regarding her mammogram.

## 2023-03-06 DIAGNOSIS — E03.9 ACQUIRED HYPOTHYROIDISM: ICD-10-CM

## 2023-03-07 RX ORDER — LEVOTHYROXINE SODIUM 0.1 MG/1
100 TABLET ORAL DAILY
Qty: 90 TABLET | Refills: 0 | Status: SHIPPED | OUTPATIENT
Start: 2023-03-07

## 2023-03-07 NOTE — TELEPHONE ENCOUNTER
Received fax from pharmacy requesting refill on pts medication(s). Pt was last seen in office on 1/16/2023  and has a follow up scheduled for Visit date not found. Will send request to  Felipe Rivera  for authorization.      Requested Prescriptions     Pending Prescriptions Disp Refills    levothyroxine (SYNTHROID) 100 MCG tablet [Pharmacy Med Name: Levothyroxine Sodium 100 MCG Oral Tablet] 90 tablet 0     Sig: Take 1 tablet by mouth once daily

## 2023-04-19 ENCOUNTER — TELEMEDICINE (OUTPATIENT)
Dept: FAMILY MEDICINE CLINIC | Age: 53
End: 2023-04-19
Payer: MEDICARE

## 2023-04-19 DIAGNOSIS — M79.7 FIBROMYALGIA: ICD-10-CM

## 2023-04-19 PROCEDURE — 99213 OFFICE O/P EST LOW 20 MIN: CPT | Performed by: NURSE PRACTITIONER

## 2023-04-19 RX ORDER — GABAPENTIN 600 MG/1
600 TABLET ORAL 3 TIMES DAILY
Qty: 90 TABLET | Refills: 5 | Status: SHIPPED | OUTPATIENT
Start: 2023-04-19 | End: 2023-05-19

## 2023-04-19 ASSESSMENT — ENCOUNTER SYMPTOMS
SORE THROAT: 0
COUGH: 0
DIARRHEA: 0
ABDOMINAL PAIN: 0
SINUS PRESSURE: 0
VOMITING: 0
NAUSEA: 0
SHORTNESS OF BREATH: 0
RHINORRHEA: 0
CONSTIPATION: 0
TROUBLE SWALLOWING: 0

## 2023-04-19 NOTE — PROGRESS NOTES
Claryce Schlatter (:  1970) is a Established patient, presenting virtually for evaluation of the following:    Assessment & Plan   Below is the assessment and plan developed based on review of pertinent history, physical exam, labs, studies, and medications. 1. Fibromyalgia  -     gabapentin (NEURONTIN) 600 MG tablet; Take 1 tablet by mouth 3 times daily for 30 days. Max Daily Amount: 1,800 mg, Disp-90 tablet, R-5Normal    Return in about 3 months (around 2023). Subjective   HPI  I need refill on gabapentin  Last fill was 2023, no signs of diversion on PDMP. Brenda Otilio reviewed ([id number])  On it for fibromyalgia. Denies any problems or concerns. Review of Systems   Constitutional:  Negative for activity change, appetite change, fatigue, fever and unexpected weight change. HENT:  Negative for congestion, hearing loss, rhinorrhea, sinus pressure, sore throat and trouble swallowing. Eyes:  Negative for visual disturbance. Respiratory:  Negative for cough and shortness of breath. Cardiovascular:  Negative for chest pain, palpitations and leg swelling. Gastrointestinal:  Negative for abdominal pain, constipation, diarrhea, nausea and vomiting. Endocrine: Negative for cold intolerance and heat intolerance. Genitourinary:  Negative for flank pain, menstrual problem, pelvic pain, urgency and vaginal discharge. Musculoskeletal:  Negative for arthralgias. Skin:  Negative for rash. Neurological:  Negative for headaches. Psychiatric/Behavioral:  Negative for dysphoric mood and sleep disturbance. The patient is not nervous/anxious.          Objective   Patient-Reported Vitals  No data recorded     Physical Exam  [INSTRUCTIONS:  \"[x]\" Indicates a positive item  \"[]\" Indicates a negative item  -- DELETE ALL ITEMS NOT EXAMINED]    Constitutional: [x] Appears well-developed and well-nourished [x] No apparent distress      [] Abnormal -     Mental status: [x] Alert and awake  [x] Oriented

## 2023-06-02 DIAGNOSIS — E03.9 ACQUIRED HYPOTHYROIDISM: ICD-10-CM

## 2023-06-02 DIAGNOSIS — F31.9 BIPOLAR 1 DISORDER (HCC): ICD-10-CM

## 2023-06-02 DIAGNOSIS — R60.9 SWELLING: ICD-10-CM

## 2023-06-02 RX ORDER — BUMETANIDE 2 MG/1
2 TABLET ORAL DAILY
Qty: 90 TABLET | Refills: 3 | Status: SHIPPED | OUTPATIENT
Start: 2023-06-02

## 2023-06-02 RX ORDER — LEVOTHYROXINE SODIUM 0.1 MG/1
100 TABLET ORAL DAILY
Qty: 90 TABLET | Refills: 3 | Status: SHIPPED | OUTPATIENT
Start: 2023-06-02

## 2023-06-02 RX ORDER — ARIPIPRAZOLE 5 MG/1
5 TABLET ORAL DAILY
Qty: 90 TABLET | Refills: 3 | Status: SHIPPED | OUTPATIENT
Start: 2023-06-02

## 2023-06-02 NOTE — TELEPHONE ENCOUNTER
Received call/My Chart Message from patient requesting refill on medication(s). Pt was last seen in office on 4/19/2023  and has a follow up scheduled for 7/17/2023. Will send request to provider for authorization.      Requested Prescriptions     Pending Prescriptions Disp Refills    ARIPiprazole (ABILIFY) 5 MG tablet 90 tablet 3     Sig: Take 1 tablet by mouth daily    levothyroxine (SYNTHROID) 100 MCG tablet 90 tablet 3     Sig: Take 1 tablet by mouth Daily    bumetanide (BUMEX) 2 MG tablet 90 tablet 3     Sig: Take 1 tablet by mouth Daily

## 2023-07-11 ENCOUNTER — TELEPHONE (OUTPATIENT)
Dept: FAMILY MEDICINE CLINIC | Age: 53
End: 2023-07-11

## 2023-07-11 NOTE — TELEPHONE ENCOUNTER
----- Message from Leny Maharaj sent at 7/11/2023  8:25 AM CDT -----  Subject: Referral Request    Reason for referral request? Pt states she was told to get bloodwork   before her 7/17 appt, but no orders appear active. Please call her to   advise. Provider patient wants to be referred to(if known):     Provider Phone Number(if known):     Additional Information for Provider?   ---------------------------------------------------------------------------  --------------  Aurelia Rumsey Mellissa    [phone]; OK to leave message on voicemail  ---------------------------------------------------------------------------  --------------

## 2023-08-02 RX ORDER — BUPROPION HYDROCHLORIDE 150 MG/1
150 TABLET ORAL EVERY MORNING
Qty: 90 TABLET | Refills: 3 | Status: SHIPPED | OUTPATIENT
Start: 2023-08-02

## 2023-08-02 NOTE — TELEPHONE ENCOUNTER
Received fax from pharmacy requesting refill on pts medication(s). Pt was last seen in office on 4/19/2023  and no follow up scheduled. Will send request to  Verona Bellamy  for patient.      Requested Prescriptions     Pending Prescriptions Disp Refills    buPROPion (WELLBUTRIN XL) 150 MG extended release tablet [Pharmacy Med Name: buPROPion HCl ER (XL) 150 MG Oral Tablet Extended Release 24 Hour] 90 tablet 3     Sig: Take 1 tablet by mouth every morning

## 2023-11-16 ENCOUNTER — TELEPHONE (OUTPATIENT)
Dept: FAMILY MEDICINE CLINIC | Age: 53
End: 2023-11-16

## 2023-11-16 DIAGNOSIS — M79.7 FIBROMYALGIA: ICD-10-CM

## 2023-11-16 RX ORDER — GABAPENTIN 600 MG/1
600 TABLET ORAL 3 TIMES DAILY
Qty: 90 TABLET | Refills: 0 | OUTPATIENT
Start: 2023-11-16

## 2023-11-16 NOTE — TELEPHONE ENCOUNTER
Patient is requesting refill on gabapentin, last seen 04/19/2023 and no appointment scheduled. Last filled on 04/19/2023.

## 2023-11-17 ENCOUNTER — TELEMEDICINE (OUTPATIENT)
Dept: FAMILY MEDICINE CLINIC | Age: 53
End: 2023-11-17
Payer: MEDICARE

## 2023-11-17 DIAGNOSIS — M79.7 FIBROMYALGIA: ICD-10-CM

## 2023-11-17 PROCEDURE — 99213 OFFICE O/P EST LOW 20 MIN: CPT | Performed by: NURSE PRACTITIONER

## 2023-11-17 RX ORDER — GABAPENTIN 600 MG/1
600 TABLET ORAL 3 TIMES DAILY
Qty: 90 TABLET | Refills: 2 | Status: SHIPPED | OUTPATIENT
Start: 2023-11-17 | End: 2024-02-15

## 2023-11-17 ASSESSMENT — ENCOUNTER SYMPTOMS
TROUBLE SWALLOWING: 0
ABDOMINAL PAIN: 0
DIARRHEA: 0
SINUS PRESSURE: 0
COUGH: 0
VOMITING: 0
SORE THROAT: 0
CONSTIPATION: 0
NAUSEA: 0
RHINORRHEA: 0
SHORTNESS OF BREATH: 0

## 2023-11-17 NOTE — PROGRESS NOTES
intolerance and heat intolerance. Genitourinary:  Negative for flank pain, menstrual problem, pelvic pain, urgency and vaginal discharge. Musculoskeletal:  Negative for arthralgias. Skin:  Negative for rash. Neurological:  Negative for headaches. Psychiatric/Behavioral:  Negative for dysphoric mood and sleep disturbance. The patient is not nervous/anxious.            Objective   Patient-Reported Vitals  No data recorded     Physical Exam  [INSTRUCTIONS:  \"[x]\" Indicates a positive item  \"[]\" Indicates a negative item  -- DELETE ALL ITEMS NOT EXAMINED]    Constitutional: [x] Appears well-developed and well-nourished [x] No apparent distress      [] Abnormal -     Mental status: [x] Alert and awake  [x] Oriented to person/place/time [x] Able to follow commands    [] Abnormal -     Eyes:   EOM    [x]  Normal    [] Abnormal -   Sclera  [x]  Normal    [] Abnormal -          Discharge [x]  None visible   [] Abnormal -     HENT: [x] Normocephalic, atraumatic  [] Abnormal -   [x] Mouth/Throat: Mucous membranes are moist    External Ears [x] Normal  [] Abnormal -    Neck: [x] No visualized mass [] Abnormal -     Pulmonary/Chest: [x] Respiratory effort normal   [x] No visualized signs of difficulty breathing or respiratory distress        [] Abnormal -      Musculoskeletal:   [x] Normal gait with no signs of ataxia         [x] Normal range of motion of neck        [] Abnormal -     Neurological:        [x] No Facial Asymmetry (Cranial nerve 7 motor function) (limited exam due to video visit)          [x] No gaze palsy        [] Abnormal -          Skin:        [x] No significant exanthematous lesions or discoloration noted on facial skin         [] Abnormal -            Psychiatric:       [x] Normal Affect [] Abnormal -        [x] No Hallucinations    Other pertinent observable physical exam findings:-             --Noy Mabry, DAVIDSON

## 2024-02-05 ENCOUNTER — OFFICE VISIT (OUTPATIENT)
Dept: FAMILY MEDICINE CLINIC | Age: 54
End: 2024-02-05
Payer: MEDICARE

## 2024-02-05 VITALS
WEIGHT: 213.13 LBS | BODY MASS INDEX: 39.22 KG/M2 | HEIGHT: 62 IN | HEART RATE: 68 BPM | TEMPERATURE: 98.7 F | DIASTOLIC BLOOD PRESSURE: 82 MMHG | OXYGEN SATURATION: 97 % | SYSTOLIC BLOOD PRESSURE: 108 MMHG

## 2024-02-05 DIAGNOSIS — E55.9 VITAMIN D DEFICIENCY: ICD-10-CM

## 2024-02-05 DIAGNOSIS — M79.7 FIBROMYALGIA: ICD-10-CM

## 2024-02-05 DIAGNOSIS — K21.9 GASTROESOPHAGEAL REFLUX DISEASE, UNSPECIFIED WHETHER ESOPHAGITIS PRESENT: ICD-10-CM

## 2024-02-05 DIAGNOSIS — Z86.010 HISTORY OF COLON POLYPS: ICD-10-CM

## 2024-02-05 DIAGNOSIS — F31.9 BIPOLAR 1 DISORDER (HCC): ICD-10-CM

## 2024-02-05 DIAGNOSIS — Z01.419 WELL FEMALE EXAM WITH ROUTINE GYNECOLOGICAL EXAM: ICD-10-CM

## 2024-02-05 DIAGNOSIS — Z12.31 ENCOUNTER FOR SCREENING MAMMOGRAM FOR MALIGNANT NEOPLASM OF BREAST: ICD-10-CM

## 2024-02-05 DIAGNOSIS — Z00.00 INITIAL MEDICARE ANNUAL WELLNESS VISIT: Primary | ICD-10-CM

## 2024-02-05 DIAGNOSIS — E53.8 B12 DEFICIENCY: ICD-10-CM

## 2024-02-05 DIAGNOSIS — E03.9 ACQUIRED HYPOTHYROIDISM: ICD-10-CM

## 2024-02-05 PROBLEM — G40.89 OTHER SEIZURES (HCC): Status: ACTIVE | Noted: 2024-02-05

## 2024-02-05 PROBLEM — R56.9 UNSPECIFIED CONVULSIONS (HCC): Status: ACTIVE | Noted: 2024-02-05

## 2024-02-05 PROCEDURE — G0438 PPPS, INITIAL VISIT: HCPCS | Performed by: NURSE PRACTITIONER

## 2024-02-05 RX ORDER — GABAPENTIN 600 MG/1
600 TABLET ORAL 3 TIMES DAILY
Qty: 90 TABLET | Refills: 2 | Status: SHIPPED | OUTPATIENT
Start: 2024-02-05 | End: 2024-05-05

## 2024-02-05 RX ORDER — OMEPRAZOLE 20 MG/1
20 TABLET, DELAYED RELEASE ORAL DAILY
Qty: 90 TABLET | Refills: 3 | Status: SHIPPED | OUTPATIENT
Start: 2024-02-05

## 2024-02-05 SDOH — ECONOMIC STABILITY: FOOD INSECURITY: WITHIN THE PAST 12 MONTHS, YOU WORRIED THAT YOUR FOOD WOULD RUN OUT BEFORE YOU GOT MONEY TO BUY MORE.: NEVER TRUE

## 2024-02-05 SDOH — ECONOMIC STABILITY: FOOD INSECURITY: WITHIN THE PAST 12 MONTHS, THE FOOD YOU BOUGHT JUST DIDN'T LAST AND YOU DIDN'T HAVE MONEY TO GET MORE.: NEVER TRUE

## 2024-02-05 SDOH — ECONOMIC STABILITY: HOUSING INSECURITY
IN THE LAST 12 MONTHS, WAS THERE A TIME WHEN YOU DID NOT HAVE A STEADY PLACE TO SLEEP OR SLEPT IN A SHELTER (INCLUDING NOW)?: NO

## 2024-02-05 SDOH — ECONOMIC STABILITY: INCOME INSECURITY: HOW HARD IS IT FOR YOU TO PAY FOR THE VERY BASICS LIKE FOOD, HOUSING, MEDICAL CARE, AND HEATING?: NOT HARD AT ALL

## 2024-02-05 ASSESSMENT — PATIENT HEALTH QUESTIONNAIRE - PHQ9
6. FEELING BAD ABOUT YOURSELF - OR THAT YOU ARE A FAILURE OR HAVE LET YOURSELF OR YOUR FAMILY DOWN: 1
3. TROUBLE FALLING OR STAYING ASLEEP: 0
8. MOVING OR SPEAKING SO SLOWLY THAT OTHER PEOPLE COULD HAVE NOTICED. OR THE OPPOSITE, BEING SO FIGETY OR RESTLESS THAT YOU HAVE BEEN MOVING AROUND A LOT MORE THAN USUAL: 0
2. FEELING DOWN, DEPRESSED OR HOPELESS: 0
SUM OF ALL RESPONSES TO PHQ QUESTIONS 1-9: 1
1. LITTLE INTEREST OR PLEASURE IN DOING THINGS: 0
5. POOR APPETITE OR OVEREATING: 0
SUM OF ALL RESPONSES TO PHQ QUESTIONS 1-9: 1
SUM OF ALL RESPONSES TO PHQ QUESTIONS 1-9: 1
4. FEELING TIRED OR HAVING LITTLE ENERGY: 0
SUM OF ALL RESPONSES TO PHQ9 QUESTIONS 1 & 2: 0
10. IF YOU CHECKED OFF ANY PROBLEMS, HOW DIFFICULT HAVE THESE PROBLEMS MADE IT FOR YOU TO DO YOUR WORK, TAKE CARE OF THINGS AT HOME, OR GET ALONG WITH OTHER PEOPLE: 0
SUM OF ALL RESPONSES TO PHQ QUESTIONS 1-9: 1
7. TROUBLE CONCENTRATING ON THINGS, SUCH AS READING THE NEWSPAPER OR WATCHING TELEVISION: 0
9. THOUGHTS THAT YOU WOULD BE BETTER OFF DEAD, OR OF HURTING YOURSELF: 0

## 2024-02-05 ASSESSMENT — ANXIETY QUESTIONNAIRES
2. NOT BEING ABLE TO STOP OR CONTROL WORRYING: 0
GAD7 TOTAL SCORE: 0
5. BEING SO RESTLESS THAT IT IS HARD TO SIT STILL: 0
IF YOU CHECKED OFF ANY PROBLEMS ON THIS QUESTIONNAIRE, HOW DIFFICULT HAVE THESE PROBLEMS MADE IT FOR YOU TO DO YOUR WORK, TAKE CARE OF THINGS AT HOME, OR GET ALONG WITH OTHER PEOPLE: NOT DIFFICULT AT ALL
4. TROUBLE RELAXING: 0
7. FEELING AFRAID AS IF SOMETHING AWFUL MIGHT HAPPEN: 0
6. BECOMING EASILY ANNOYED OR IRRITABLE: 0
3. WORRYING TOO MUCH ABOUT DIFFERENT THINGS: 0
1. FEELING NERVOUS, ANXIOUS, OR ON EDGE: 0

## 2024-02-05 NOTE — PATIENT INSTRUCTIONS
Need for shingles vaccine      Learning About Mild Cognitive Impairment (MCI)  What is mild cognitive impairment (MCI)?     It's common to forget things sometimes as we get older. But some older people have memory loss that's more than normal aging. It's called mild cognitive impairment, or MCI. It is not the same as dementia.  People with the condition often know that their memory or mental function has changed. Tests may show some loss. But their minds work well overall. They can carry out daily tasks that are normal for them.  People with MCI have a higher chance of one day getting dementia. But not all people who have it will get dementia. Some people may stay the same over time.  What are the symptoms?  People with MCI have more memory loss than what occurs with normal aging. They may have increasing trouble with recalling words and keeping up with conversations. They may also have trouble remembering important events and making decisions.  What puts you at risk?  The risk of getting MCI increases with age. Having high blood pressure or having a family history of MCI may also increase your risk.  How is it diagnosed?  Your doctor will do a physical exam.  You may be asked questions to check your memory and other mental skills. Your doctor may also talk to close friends and family members. This can help the doctor figure out how your memory and other mental skills have changed.  You may get blood tests and tests that look at your brain.  These questions and tests can make sure you don't have other conditions that can cause symptoms like MCI. These include depression, sleep problems, and side effects from medicines.  How is it treated?  There are no medicines to treat MCI or to keep it from progressing to dementia. But treating conditions like high blood pressure and diabetes may help. A person with MCI needs routine follow-up visits with their doctor to check on changes in the person's mental skills.  How can

## 2024-02-05 NOTE — PROGRESS NOTES
Medicare Annual Wellness Visit    Cecile Parker is here for Medicare AWV    Assessment & Plan   Initial Medicare annual wellness visit  Fibromyalgia  -     CBC with Auto Differential; Future  -     Comprehensive Metabolic Panel; Future  -     Lipid Panel; Future  -     TSH; Future  -     T4, Free; Future  -     Microalbumin / Creatinine Urine Ratio; Future  -     ANTONIO DIGITAL SCREEN W OR WO CAD BILATERAL; Future  -     Vitamin D 25 Hydroxy; Future  -     gabapentin (NEURONTIN) 600 MG tablet; Take 1 tablet by mouth 3 times daily for 90 days. Max Daily Amount: 1,800 mg, Disp-90 tablet, R-2Normal  Bipolar 1 disorder (HCC)  -     CBC with Auto Differential; Future  -     Comprehensive Metabolic Panel; Future  -     Lipid Panel; Future  -     TSH; Future  -     T4, Free; Future  -     Microalbumin / Creatinine Urine Ratio; Future  -     ANTONIO DIGITAL SCREEN W OR WO CAD BILATERAL; Future  -     Vitamin D 25 Hydroxy; Future  Acquired hypothyroidism  -     CBC with Auto Differential; Future  -     Comprehensive Metabolic Panel; Future  -     Lipid Panel; Future  -     TSH; Future  -     T4, Free; Future  -     Microalbumin / Creatinine Urine Ratio; Future  -     ANTONIO DIGITAL SCREEN W OR WO CAD BILATERAL; Future  -     Vitamin D 25 Hydroxy; Future  Vitamin D deficiency  -     CBC with Auto Differential; Future  -     Comprehensive Metabolic Panel; Future  -     Lipid Panel; Future  -     TSH; Future  -     T4, Free; Future  -     Microalbumin / Creatinine Urine Ratio; Future  -     ANTONIO DIGITAL SCREEN W OR WO CAD BILATERAL; Future  -     Vitamin D 25 Hydroxy; Future  Gastroesophageal reflux disease, unspecified whether esophagitis present  -     CBC with Auto Differential; Future  -     Comprehensive Metabolic Panel; Future  -     Lipid Panel; Future  -     TSH; Future  -     T4, Free; Future  -     Microalbumin / Creatinine Urine Ratio; Future  -     ANTONIO DIGITAL SCREEN W OR WO CAD BILATERAL; Future  -     Vitamin D 25 Hydroxy;

## 2024-02-06 PROBLEM — G40.89 OTHER SEIZURES (HCC): Status: RESOLVED | Noted: 2024-02-05 | Resolved: 2024-02-06

## 2024-02-06 PROBLEM — R56.9 UNSPECIFIED CONVULSIONS (HCC): Status: RESOLVED | Noted: 2024-02-05 | Resolved: 2024-02-06

## 2024-02-09 ENCOUNTER — TELEPHONE (OUTPATIENT)
Dept: FAMILY MEDICINE CLINIC | Age: 54
End: 2024-02-09

## 2024-02-09 DIAGNOSIS — Z12.31 ENCOUNTER FOR SCREENING MAMMOGRAM FOR MALIGNANT NEOPLASM OF BREAST: ICD-10-CM

## 2024-02-09 DIAGNOSIS — K21.9 GASTROESOPHAGEAL REFLUX DISEASE, UNSPECIFIED WHETHER ESOPHAGITIS PRESENT: ICD-10-CM

## 2024-02-09 DIAGNOSIS — F31.9 BIPOLAR 1 DISORDER (HCC): ICD-10-CM

## 2024-02-09 DIAGNOSIS — M79.7 FIBROMYALGIA: ICD-10-CM

## 2024-02-09 DIAGNOSIS — E03.9 ACQUIRED HYPOTHYROIDISM: ICD-10-CM

## 2024-02-09 DIAGNOSIS — E55.9 VITAMIN D DEFICIENCY: ICD-10-CM

## 2024-02-09 NOTE — TELEPHONE ENCOUNTER
----- Message from DAVIDSON Calix sent at 2/9/2024  4:11 PM CST -----  Please let patient know that mammogram was negative.  Continue yearly routine mammograms. Thanks!

## 2024-02-09 NOTE — TELEPHONE ENCOUNTER
Called on 02/06 and 02/09 and left message to try to schedule patient for appt with [facility]. Awaiting a call back.

## 2024-04-04 DIAGNOSIS — R60.9 SWELLING: ICD-10-CM

## 2024-04-04 RX ORDER — POTASSIUM CHLORIDE 750 MG/1
10 TABLET, FILM COATED, EXTENDED RELEASE ORAL DAILY
Qty: 90 TABLET | Refills: 3 | Status: SHIPPED | OUTPATIENT
Start: 2024-04-04

## 2024-04-04 NOTE — TELEPHONE ENCOUNTER
Received fax from pharmacy requesting refill on pts medication(s). Pt was last seen in office on 2/5/2024  and has a follow up scheduled for Visit date not found. Will send request to  Arturo Montgomery  for authorization.     Requested Prescriptions     Pending Prescriptions Disp Refills    potassium chloride (KLOR-CON) 10 MEQ extended release tablet [Pharmacy Med Name: Potassium Chloride ER 10 MEQ Oral Tablet Extended Release] 90 tablet 0     Sig: Take 1 tablet by mouth once daily

## 2024-05-17 DIAGNOSIS — R60.9 SWELLING: ICD-10-CM

## 2024-05-17 RX ORDER — BUMETANIDE 2 MG/1
2 TABLET ORAL DAILY
Qty: 90 TABLET | Refills: 3 | Status: SHIPPED | OUTPATIENT
Start: 2024-05-17

## 2024-05-17 NOTE — TELEPHONE ENCOUNTER
Received fax from pharmacy requesting refill on pts medication(s). Pt was last seen in office on 2/5/2024  and has a follow up scheduled for Visit date not found. Will send request to  Arturo Montgomery  for authorization.     Requested Prescriptions     Pending Prescriptions Disp Refills    bumetanide (BUMEX) 2 MG tablet [Pharmacy Med Name: Bumetanide 2 MG Oral Tablet] 90 tablet 0     Sig: Take 1 tablet by mouth once daily

## 2024-05-27 DIAGNOSIS — E03.9 ACQUIRED HYPOTHYROIDISM: ICD-10-CM

## 2024-05-27 DIAGNOSIS — F31.9 BIPOLAR 1 DISORDER (HCC): ICD-10-CM

## 2024-05-28 RX ORDER — LEVOTHYROXINE SODIUM 0.1 MG/1
100 TABLET ORAL DAILY
Qty: 90 TABLET | Refills: 3 | Status: SHIPPED | OUTPATIENT
Start: 2024-05-28

## 2024-05-28 RX ORDER — ARIPIPRAZOLE 5 MG/1
5 TABLET ORAL DAILY
Qty: 90 TABLET | Refills: 3 | Status: SHIPPED | OUTPATIENT
Start: 2024-05-28

## 2024-05-28 NOTE — TELEPHONE ENCOUNTER
Received fax from pharmacy requesting refill on pts medication(s). Pt was last seen in office on 2/5/2024  and has a follow up scheduled for Visit date not found. Will send request to  Arturo Montgomery  for authorization.     Requested Prescriptions     Pending Prescriptions Disp Refills    ARIPiprazole (ABILIFY) 5 MG tablet [Pharmacy Med Name: ARIPiprazole 5 MG Oral Tablet] 90 tablet 0     Sig: Take 1 tablet by mouth once daily    levothyroxine (SYNTHROID) 100 MCG tablet [Pharmacy Med Name: Levothyroxine Sodium 100 MCG Oral Tablet] 90 tablet 0     Sig: Take 1 tablet by mouth once daily

## 2024-06-19 DIAGNOSIS — M79.7 FIBROMYALGIA: ICD-10-CM

## 2024-06-19 RX ORDER — GABAPENTIN 600 MG/1
600 TABLET ORAL 3 TIMES DAILY
Qty: 90 TABLET | Refills: 0 | OUTPATIENT
Start: 2024-06-19

## 2024-06-19 RX ORDER — GABAPENTIN 600 MG/1
600 TABLET ORAL 3 TIMES DAILY
Qty: 90 TABLET | Refills: 1 | Status: SHIPPED | OUTPATIENT
Start: 2024-06-19 | End: 2024-09-17

## 2024-06-19 NOTE — TELEPHONE ENCOUNTER
Patient is requesting a refill on gabapentin. Patient was last seen on 02/05/2024 , with a follow up on not at this time. The prescription was last filled on 05/07/2024

## 2024-08-07 RX ORDER — BUPROPION HYDROCHLORIDE 150 MG/1
150 TABLET ORAL EVERY MORNING
Qty: 90 TABLET | Refills: 3 | Status: SHIPPED | OUTPATIENT
Start: 2024-08-07

## 2024-09-22 DIAGNOSIS — M79.7 FIBROMYALGIA: ICD-10-CM

## 2024-09-23 DIAGNOSIS — M79.7 FIBROMYALGIA: ICD-10-CM

## 2024-09-23 RX ORDER — GABAPENTIN 600 MG/1
TABLET ORAL
Qty: 90 TABLET | Refills: 0 | OUTPATIENT
Start: 2024-09-23

## 2024-09-24 RX ORDER — GABAPENTIN 600 MG/1
600 TABLET ORAL 3 TIMES DAILY
Qty: 90 TABLET | Refills: 1 | OUTPATIENT
Start: 2024-09-24 | End: 2024-12-23

## 2024-10-03 NOTE — TELEPHONE ENCOUNTER
Patient called for a refill of her gabapentin. She has not been seen since 2/5/24. I scheduled her a VV appt with [facility]aco for Monday 10/7/24 at 8am.

## 2024-10-07 ENCOUNTER — TELEMEDICINE (OUTPATIENT)
Dept: FAMILY MEDICINE CLINIC | Age: 54
End: 2024-10-07
Payer: MEDICARE

## 2024-10-07 DIAGNOSIS — M79.7 FIBROMYALGIA: ICD-10-CM

## 2024-10-07 PROCEDURE — 99213 OFFICE O/P EST LOW 20 MIN: CPT | Performed by: NURSE PRACTITIONER

## 2024-10-07 RX ORDER — GABAPENTIN 600 MG/1
600 TABLET ORAL 3 TIMES DAILY
Qty: 90 TABLET | Refills: 0 | Status: SHIPPED | OUTPATIENT
Start: 2024-10-07 | End: 2025-01-05

## 2024-10-07 SDOH — ECONOMIC STABILITY: FOOD INSECURITY: WITHIN THE PAST 12 MONTHS, YOU WORRIED THAT YOUR FOOD WOULD RUN OUT BEFORE YOU GOT MONEY TO BUY MORE.: NEVER TRUE

## 2024-10-07 SDOH — ECONOMIC STABILITY: FOOD INSECURITY: WITHIN THE PAST 12 MONTHS, THE FOOD YOU BOUGHT JUST DIDN'T LAST AND YOU DIDN'T HAVE MONEY TO GET MORE.: NEVER TRUE

## 2024-10-07 SDOH — ECONOMIC STABILITY: INCOME INSECURITY: HOW HARD IS IT FOR YOU TO PAY FOR THE VERY BASICS LIKE FOOD, HOUSING, MEDICAL CARE, AND HEATING?: NOT HARD AT ALL

## 2024-10-07 ASSESSMENT — PATIENT HEALTH QUESTIONNAIRE - PHQ9
9. THOUGHTS THAT YOU WOULD BE BETTER OFF DEAD, OR OF HURTING YOURSELF: NOT AT ALL
3. TROUBLE FALLING OR STAYING ASLEEP: NOT AT ALL
1. LITTLE INTEREST OR PLEASURE IN DOING THINGS: NOT AT ALL
SUM OF ALL RESPONSES TO PHQ QUESTIONS 1-9: 0
7. TROUBLE CONCENTRATING ON THINGS, SUCH AS READING THE NEWSPAPER OR WATCHING TELEVISION: NOT AT ALL
SUM OF ALL RESPONSES TO PHQ QUESTIONS 1-9: 0
SUM OF ALL RESPONSES TO PHQ QUESTIONS 1-9: 0
6. FEELING BAD ABOUT YOURSELF - OR THAT YOU ARE A FAILURE OR HAVE LET YOURSELF OR YOUR FAMILY DOWN: NOT AT ALL
10. IF YOU CHECKED OFF ANY PROBLEMS, HOW DIFFICULT HAVE THESE PROBLEMS MADE IT FOR YOU TO DO YOUR WORK, TAKE CARE OF THINGS AT HOME, OR GET ALONG WITH OTHER PEOPLE: NOT DIFFICULT AT ALL
SUM OF ALL RESPONSES TO PHQ QUESTIONS 1-9: 0
SUM OF ALL RESPONSES TO PHQ9 QUESTIONS 1 & 2: 0
4. FEELING TIRED OR HAVING LITTLE ENERGY: NOT AT ALL
2. FEELING DOWN, DEPRESSED OR HOPELESS: NOT AT ALL
8. MOVING OR SPEAKING SO SLOWLY THAT OTHER PEOPLE COULD HAVE NOTICED. OR THE OPPOSITE, BEING SO FIGETY OR RESTLESS THAT YOU HAVE BEEN MOVING AROUND A LOT MORE THAN USUAL: NOT AT ALL
5. POOR APPETITE OR OVEREATING: NOT AT ALL

## 2024-10-07 ASSESSMENT — ENCOUNTER SYMPTOMS
TROUBLE SWALLOWING: 0
ABDOMINAL PAIN: 0
SORE THROAT: 0
SINUS PRESSURE: 0
SHORTNESS OF BREATH: 0
CONSTIPATION: 0
COUGH: 0
NAUSEA: 0
VOMITING: 0
DIARRHEA: 0
RHINORRHEA: 0

## 2024-10-07 NOTE — PATIENT INSTRUCTIONS
Get fasting before next appointment in 1 month. I only gave one month of gabapentin and will not refill without the labs being done.

## 2025-03-27 DIAGNOSIS — R60.9 SWELLING: ICD-10-CM

## 2025-03-27 RX ORDER — POTASSIUM CHLORIDE 750 MG/1
10 TABLET, EXTENDED RELEASE ORAL DAILY
Qty: 90 TABLET | Refills: 3 | Status: SHIPPED | OUTPATIENT
Start: 2025-03-27

## 2025-03-27 NOTE — TELEPHONE ENCOUNTER
Received fax from pharmacy requesting refill on pts medication(s). Pt was last seen in office on 10/7/2024  and has a follow up scheduled for Visit date not found. Will send request to  Arturo Montgomery  for authorization.     Requested Prescriptions     Pending Prescriptions Disp Refills    potassium chloride (KLOR-CON) 10 MEQ extended release tablet [Pharmacy Med Name: Potassium Chloride ER 10 MEQ Oral Tablet Extended Release] 90 tablet 3     Sig: Take 1 tablet by mouth once daily

## 2025-04-01 DIAGNOSIS — K21.9 GASTROESOPHAGEAL REFLUX DISEASE, UNSPECIFIED WHETHER ESOPHAGITIS PRESENT: ICD-10-CM

## 2025-04-01 RX ORDER — OMEPRAZOLE 20 MG/1
20 CAPSULE, DELAYED RELEASE ORAL DAILY
Qty: 90 CAPSULE | Refills: 3 | Status: SHIPPED | OUTPATIENT
Start: 2025-04-01

## 2025-04-01 NOTE — TELEPHONE ENCOUNTER
Received fax from pharmacy requesting refill on pts medication(s). Pt was last seen in office on 10/7/2024  and has a follow up scheduled for Visit date not found. Will send request to  Arturo Montgomery  for authorization.     Requested Prescriptions     Pending Prescriptions Disp Refills    omeprazole (PRILOSEC) 20 MG delayed release capsule [Pharmacy Med Name: OMEPRAZOLE 20MG CAP] 90 capsule 0     Sig: Take 1 capsule by mouth once daily

## 2025-05-26 DIAGNOSIS — R60.9 SWELLING: ICD-10-CM

## 2025-05-27 RX ORDER — BUMETANIDE 2 MG/1
2 TABLET ORAL DAILY
Qty: 90 TABLET | Refills: 0 | OUTPATIENT
Start: 2025-05-27

## 2025-05-27 NOTE — TELEPHONE ENCOUNTER
Pt has not been seen in office within the past year. I tried to call the pt, but the number seems to have been disconnected. I then tried to call the pts other contact but her VM box was full.

## 2025-06-09 DIAGNOSIS — R60.9 SWELLING: ICD-10-CM

## 2025-06-09 RX ORDER — BUMETANIDE 2 MG/1
2 TABLET ORAL DAILY
Qty: 90 TABLET | Refills: 0 | OUTPATIENT
Start: 2025-06-09

## 2025-06-13 DIAGNOSIS — E03.9 ACQUIRED HYPOTHYROIDISM: ICD-10-CM

## 2025-06-13 DIAGNOSIS — F31.9 BIPOLAR 1 DISORDER (HCC): ICD-10-CM

## 2025-06-13 DIAGNOSIS — R60.9 SWELLING: ICD-10-CM

## 2025-06-16 RX ORDER — LEVOTHYROXINE SODIUM 100 UG/1
100 TABLET ORAL DAILY
Qty: 30 TABLET | Refills: 0 | Status: SHIPPED | OUTPATIENT
Start: 2025-06-16

## 2025-06-16 RX ORDER — ARIPIPRAZOLE 5 MG/1
5 TABLET ORAL DAILY
Qty: 30 TABLET | Refills: 0 | Status: SHIPPED | OUTPATIENT
Start: 2025-06-16

## 2025-06-16 RX ORDER — BUMETANIDE 2 MG/1
2 TABLET ORAL DAILY
Qty: 30 TABLET | Refills: 0 | Status: SHIPPED | OUTPATIENT
Start: 2025-06-16 | End: 2025-07-14

## 2025-07-23 DIAGNOSIS — F31.9 BIPOLAR 1 DISORDER (HCC): ICD-10-CM

## 2025-07-23 DIAGNOSIS — E03.9 ACQUIRED HYPOTHYROIDISM: ICD-10-CM

## 2025-07-23 RX ORDER — LEVOTHYROXINE SODIUM 100 UG/1
100 TABLET ORAL DAILY
Qty: 30 TABLET | Refills: 0 | OUTPATIENT
Start: 2025-07-23

## 2025-07-23 RX ORDER — ARIPIPRAZOLE 5 MG/1
5 TABLET ORAL DAILY
Qty: 30 TABLET | Refills: 0 | OUTPATIENT
Start: 2025-07-23

## 2025-07-23 NOTE — TELEPHONE ENCOUNTER
Received fax from pharmacy requesting refill on pts medication(s). Pt was last seen in office on Visit date not found  and has a follow up scheduled for Visit date not found. Will send request to  Arturo Montgomery  for authorization.     Requested Prescriptions     Pending Prescriptions Disp Refills    levothyroxine (SYNTHROID) 100 MCG tablet [Pharmacy Med Name: Levothyroxine Sodium 100 MCG Oral Tablet] 30 tablet 0     Sig: Take 1 tablet by mouth once daily    ARIPiprazole (ABILIFY) 5 MG tablet [Pharmacy Med Name: ARIPiprazole 5 MG Oral Tablet] 30 tablet 0     Sig: Take 1 tablet by mouth once daily